# Patient Record
Sex: FEMALE | Race: WHITE | NOT HISPANIC OR LATINO | Employment: FULL TIME | URBAN - METROPOLITAN AREA
[De-identification: names, ages, dates, MRNs, and addresses within clinical notes are randomized per-mention and may not be internally consistent; named-entity substitution may affect disease eponyms.]

---

## 2017-01-04 ENCOUNTER — OFFICE VISIT (OUTPATIENT)
Dept: URGENT CARE | Facility: PHYSICIAN GROUP | Age: 57
End: 2017-01-04
Payer: COMMERCIAL

## 2017-01-04 VITALS
SYSTOLIC BLOOD PRESSURE: 122 MMHG | TEMPERATURE: 99.1 F | HEART RATE: 77 BPM | WEIGHT: 142 LBS | RESPIRATION RATE: 14 BRPM | HEIGHT: 67 IN | OXYGEN SATURATION: 99 % | BODY MASS INDEX: 22.29 KG/M2 | DIASTOLIC BLOOD PRESSURE: 78 MMHG

## 2017-01-04 DIAGNOSIS — J01.90 ACUTE RHINOSINUSITIS: ICD-10-CM

## 2017-01-04 PROCEDURE — 99214 OFFICE O/P EST MOD 30 MIN: CPT | Performed by: PHYSICIAN ASSISTANT

## 2017-01-04 RX ORDER — AMOXICILLIN AND CLAVULANATE POTASSIUM 875; 125 MG/1; MG/1
1 TABLET, FILM COATED ORAL 2 TIMES DAILY
Qty: 20 TAB | Refills: 0 | Status: SHIPPED | OUTPATIENT
Start: 2017-01-04 | End: 2017-03-29

## 2017-01-04 RX ORDER — FLUTICASONE PROPIONATE 50 MCG
1 SPRAY, SUSPENSION (ML) NASAL 2 TIMES DAILY
Qty: 16 G | Refills: 0 | Status: SHIPPED | OUTPATIENT
Start: 2017-01-04 | End: 2018-06-22

## 2017-01-04 NOTE — Clinical Note
January 4, 2017         Patient: Mei Sullivan   YOB: 1960   Date of Visit: 1/4/2017           To Whom it May Concern:    Mei Sullivan was seen in my clinic on 1/4/2017. She is to be off work today and tomorrow to recover.      If you have any questions or concerns, please don't hesitate to call.        Sincerely,           Leonor Potts PA-C  Electronically Signed

## 2017-01-04 NOTE — PROGRESS NOTES
Chief Complaint   Patient presents with   • Cough     W/ congestion and sinus pain       HISTORY OF PRESENT ILLNESS: Patient is a 56 y.o. female who presents today for 8 or so days of sinus congestion and pressure.  She has had sore throat on and off.   Fevers tactile on and off. Feels worse at night.   She is only really having a cough since last night.   She feels overall she is worsening.   Former smoker, no hx of COPD.  She has pressure diffusely however is seemingly worsening on the right cheek.  Taking NyQuil and DayQuil with mild relief.      Patient Active Problem List    Diagnosis Date Noted   • Dyspnea    • Palpitations    • Cox's esophagus 06/22/2015   • Hepatitis B antibody positive 03/12/2015   • Lesion of plantar nerve 10/18/2013   • Bulimia 01/12/2012   • Insomnia 03/11/2011   • CATARACT 01/25/2010   • Osteopenia 01/05/2010   • Constipation 01/05/2010   • Menopause 01/05/2010   • Osteoarthritis 01/05/2010       Allergies:Fosamax    Current Outpatient Prescriptions Ordered in [facility]   Medication Sig Dispense Refill   • zolpidem (AMBIEN) 5 MG Tab TAKE ONE-HALF TABLET BY MOUTH EVERY OTHER NIGHT 30 Tab 2   • naproxen (NAPROSYN) 500 MG Tab Take 1 Tab by mouth 2 times a day, with meals. 60 Tab 3   • cyclobenzaprine (FLEXERIL) 5 MG tablet Take 1-2 Tabs by mouth at bedtime as needed. 20 Tab 0   • albuterol (VENTOLIN OR PROVENTIL) 108 (90 BASE) MCG/ACT AERS inhalation aerosol Inhale 1-2 Puffs by mouth every 6 hours as needed for Shortness of Breath. 8.5 g 0   • Spacer/Aero Chamber Mouthpiece MISC 1 Applicator by Does not apply route every 6 hours as needed (use with inhaler). 1 Each 0   • meloxicam (MOBIC) 15 MG tablet TAKE ONE TABLET BY MOUTH EVERY DAY AS NEEDED FOR ARTHRITIS 90 Tab 2   • Protein POWD Take 2 Tbsp by mouth every 48 hours. Patient uses Isopure Powder in milk with banana and this helps hot flashes      • Calcium Carbonate-Vit D-Min (CALTRATE 600+D PLUS) 600-400 MG-UNIT TABS Take 2 Tabs by  "mouth every day.       No current [facility]-ordered facility-administered medications on file.       Past Medical History   Diagnosis Date   • Arthritis      Osteo; both wrists   • Dyspnea    • Palpitations        Social History   Substance Use Topics   • Smoking status: Former Smoker     Types: Cigarettes     Quit date: 10/13/2008   • Smokeless tobacco: Never Used   • Alcohol Use: No       Family Status   Relation Status Death Age   • Mother Alive    • Father Alive    • Sister Alive      Family History   Problem Relation Age of Onset   • Hypertension Father        ROS:  Review of Systems   Constitutional: SEE HPI  HENT: SEE HPI  Eyes: Negative for blurred vision.   Respiratory: SEE HPI    Cardiovascular: Negative for chest pain, palpitations, orthopnea and leg swelling.   Gastrointestinal: Negative for heartburn, nausea, vomiting and abdominal pain.   All other systems reviewed and are negative.     Exam:  Blood pressure 122/78, pulse 77, temperature 37.3 °C (99.1 °F), resp. rate 14, height 1.702 m (5' 7\"), weight 64.411 kg (142 lb), last menstrual period 08/01/2006, SpO2 99 %.  General:  Well nourished, well developed female in NAD  Eyes: PERRLA, EOM within normal limits, no conjunctival injection, no scleral icterus, visual fields and acuity grossly intact.  Ears: Normal shape and symmetry, no tenderness, no discharge. External canals are without any significant edema or erythema. Tympanic membranes are without any inflammation, no effusion. Gross auditory acuity is intact  Nose: Symmetrical, right maxillary sinus TTP, clear rhinorrhea.   Mouth: reasonable hygiene, no erythema exudates or tonsillar enlargement.  Neck: no masses, range of motion within normal limits, no tracheal deviation. No lymphadenopathy  Pulmonary: Normal respiratory effort, no wheezes, crackles, or rhonchi.  Cardiovascular: regular rate and rhythm without murmurs, rubs, or gallops.  Skin: No visible rashes or lesion. Warm, pink, dry. "   Extremities: no clubbing, cyanosis, or edema.  Neuro: A&O x 3. Speech normal/clear.  Normal gait.         Assessment/Plan:  1. Acute rhinosinusitis  amoxicillin-clavulanate (AUGMENTIN) 875-125 MG Tab    fluticasone (FLONASE) 50 MCG/ACT nasal spray       -fluids, rest emphasized.  Flonase/Allegra or similar for post nasal drainage trial.   -humidifier/steam inhalations.    -discussed trial of targeted sinus care. With contingent antibiotic prescription given to patient to fill upon meeting criteria of guidelines discussed.       Supportive care, differential diagnoses, and indications for immediate follow-up discussed with patient.   Pathogenesis of diagnosis discussed including typical length and natural progression.   Instructed to return to clinic or nearest emergency department for any change in condition, further concerns, or worsening of symptoms.  Patient states understanding of the plan of care and discharge instructions.  Instructed to make an appointment, for follow up, with their primary care provider.      Leonor Potts PA-C

## 2017-03-29 ENCOUNTER — OFFICE VISIT (OUTPATIENT)
Dept: MEDICAL GROUP | Facility: PHYSICIAN GROUP | Age: 57
End: 2017-03-29
Payer: COMMERCIAL

## 2017-03-29 ENCOUNTER — HOSPITAL ENCOUNTER (OUTPATIENT)
Facility: MEDICAL CENTER | Age: 57
End: 2017-03-29
Attending: FAMILY MEDICINE
Payer: COMMERCIAL

## 2017-03-29 VITALS
WEIGHT: 154 LBS | BODY MASS INDEX: 24.17 KG/M2 | TEMPERATURE: 97.6 F | HEIGHT: 67 IN | HEART RATE: 78 BPM | RESPIRATION RATE: 16 BRPM | SYSTOLIC BLOOD PRESSURE: 118 MMHG | DIASTOLIC BLOOD PRESSURE: 60 MMHG

## 2017-03-29 DIAGNOSIS — M85.80 OSTEOPENIA: ICD-10-CM

## 2017-03-29 DIAGNOSIS — K22.719 BARRETT'S ESOPHAGUS WITH DYSPLASIA: ICD-10-CM

## 2017-03-29 DIAGNOSIS — Z98.890 S/P BALLOON DILATATION OF ESOPHAGEAL STRICTURE: ICD-10-CM

## 2017-03-29 DIAGNOSIS — Z00.00 HEALTH CARE MAINTENANCE: Primary | ICD-10-CM

## 2017-03-29 DIAGNOSIS — N95.8 OTHER SPECIFIED MENOPAUSAL AND POSTMENOPAUSAL DISORDER: ICD-10-CM

## 2017-03-29 PROCEDURE — 88175 CYTOPATH C/V AUTO FLUID REDO: CPT

## 2017-03-29 PROCEDURE — 99396 PREV VISIT EST AGE 40-64: CPT | Performed by: FAMILY MEDICINE

## 2017-03-29 RX ORDER — PANTOPRAZOLE SODIUM 40 MG/1
40 TABLET, DELAYED RELEASE ORAL DAILY
Qty: 90 TAB | Refills: 3 | Status: SHIPPED | OUTPATIENT
Start: 2017-03-29 | End: 2018-04-20 | Stop reason: SDUPTHER

## 2017-03-29 ASSESSMENT — PATIENT HEALTH QUESTIONNAIRE - PHQ9: CLINICAL INTERPRETATION OF PHQ2 SCORE: 0

## 2017-03-29 NOTE — PROGRESS NOTES
Patient comes in for annual exam. Her last Pap smear was in 2014. She is doing well. She has no pelvic pain or vaginal discharge or bleeding. She stopped taking pantoprazole for a while. She does have Cox's esophagus and history of esophageal stricture which had very dilated. I told to go back on pantoprazole. She'll be following up with her gastroenterologist soon.  Patient's due for lab work.  Patient's due for bone density.  She's been promoted in her job at [employer]. She is attending college online to get a bachelor's in Healthcare Business.      Review of Systems   Constitutional: Negative.  Negative for fever, chills, weight loss and malaise/fatigue.   HENT: Negative for hearing loss, ear pain, congestion, sore throat, neck pain and tinnitus.    Eyes: Negative for blurred vision, double vision and pain.   Respiratory: Negative for cough, hemoptysis, shortness of breath and wheezing.    Cardiovascular: Negative for chest pain, palpitations, orthopnea, claudication, leg swelling and PND.   Gastrointestinal: Negative for heartburn, nausea, vomiting, abdominal pain, diarrhea, constipation, blood in stool and melena.   Genitourinary: Negative for incontinence, dysuria, urgency, frequency and hematuria. Female-- Negative for pelvic pain, vaginal discharge, or abnormal bleeding. No breast lumps, or masses, nipple bleeding or discharge.   Musculoskeletal: Negative for myalgias, back pain and joint pain.   Skin: Negative for rash and itching. No lesions that will not heal.  Neurological: Negative for dizziness, tingling, tremors, focal weakness, seizures, loss of consciousness and headaches.   Endo/Heme/Allergies: Negative for environmental allergies and polydipsia.  Does not bruise/bleed easily.   Psychiatric/Behavioral: Negative for depression, memory loss and substance abuse.  The patient is not nervous/anxious and does not have insomnia.  All others negative.      I reviewed the following    Past Medical History    Diagnosis Date   • Arthritis      Osteo; both wrists   • Dyspnea    • Palpitations         Past Surgical History   Procedure Laterality Date   • Tonsillectomy and adenoidectomy  1972   • Laminotomy  1985     coccyxectomy   • Neuroma mortons excision  1990     Left foot   • Neuroma excision  10/18/2013     Performed by FELIPE DoanPSIMIN at [facility]       Allergies   Allergen Reactions   • Fosamax      Esophageal Pain       Current Outpatient Prescriptions   Medication Sig Dispense Refill   • pantoprazole (PROTONIX) 40 MG Tablet Delayed Response Take 1 Tab by mouth every day. 90 Tab 3   • Protein POWD Take 2 Tbsp by mouth every 48 hours. Patient uses Isopure Powder in milk with banana and this helps hot flashes      • Calcium Carbonate-Vit D-Min (CALTRATE 600+D PLUS) 600-400 MG-UNIT TABS Take 2 Tabs by mouth every day.     • fluticasone (FLONASE) 50 MCG/ACT nasal spray Spray 1 Spray in nose 2 times a day. 16 g 0   • zolpidem (AMBIEN) 5 MG Tab TAKE ONE-HALF TABLET BY MOUTH EVERY OTHER NIGHT 30 Tab 2   • naproxen (NAPROSYN) 500 MG Tab Take 1 Tab by mouth 2 times a day, with meals. 60 Tab 3   • cyclobenzaprine (FLEXERIL) 5 MG tablet Take 1-2 Tabs by mouth at bedtime as needed. 20 Tab 0     No current facility-administered medications for this visit.        Family History   Problem Relation Age of Onset   • Hypertension Father        Social History     Social History   • Marital Status:      Spouse Name: N/A   • Number of Children: N/A   • Years of Education: N/A     Occupational History   • Not on file.     Social History Main Topics   • Smoking status: Former Smoker     Types: Cigarettes     Quit date: 10/13/2008   • Smokeless tobacco: Never Used   • Alcohol Use: No   • Drug Use: No   • Sexual Activity:     Partners: Male     Birth Control/ Protection: Post-Menopausal     Other Topics Concern   • Not on file     Social History Narrative          Physical Exam   Constitutional: She  is oriented. She appears well-developed and well-nourished. She appears not diaphoretic. No distress.   HENT:   Head: Normocephalic and atraumatic.   Right Ear: External ear normal. Ear canal and TM normal  Left Ear: External ear normal. Ear canal and TM normal  Nose: Nose normal.   Mouth/Throat: Oropharynx is clear and moist. No oropharyngeal exudate.   Eyes: Conjunctivae and extraocular motions are normal. Pupils are equal, round, and reactive to light. Right eye exhibits no discharge. Left eye exhibits no discharge. No scleral icterus. Fundi benign bilaterally.   Neck: Normal range of motion. Neck supple. No tracheal deviation present. No thyromegaly present.   Cardiovascular: Normal rate, regular rhythm and normal heart sounds.  Exam reveals no gallop.    No murmur heard.  Pulmonary/Chest: Effort normal and breath sounds normal. No stridor. No respiratory distress. She has no wheezes. She has no rales. Right breast exhibits no inverted nipple, no mass, no nipple discharge, no skin change and no tenderness. Left breast exhibits no inverted nipple, no mass, no nipple discharge, no skin change and no tenderness. Breasts are symmetrical.        No axillary adenopathy   Abdominal: Soft. Bowel sounds are normal. She exhibits no distension and no mass. No tenderness. No hepatosplenomegaly. She has no rebound and no guarding. Hernia confirmed negative in the right inguinal area and confirmed negative in the left inguinal area.   Genitourinary: BUS within normal limits and introitus appears normal. Vagina normal and uterus normal.   Uterus is not deviated, not enlarged, not fixed and not tender. Cervix exhibits no motion tenderness, no discharge and no friability. Right adnexum displays no mass, no tenderness and no fullness. Left adnexum displays no mass, no tenderness and no fullness. No discharge found.  Rectal exam is without mass or pain. There is good sphincter tone. Stool is light brown and occult blood negative.    Musculoskeletal: Normal range of motion. She exhibits no edema and no tenderness.   Lymphadenopathy:   She has no cervical adenopathy.   She has no supraclavicular adenopathy       Right: No inguinal adenopathy present.        Left: No inguinal adenopathy present.   Neurological: She is alert and oriented. She displays normal reflexes. No cranial nerve deficit. She exhibits normal muscle tone. Coordination normal.   Skin: Skin is warm. No rash noted. She is not diaphoretic. No erythema. No pallor.   Psychiatric: She has a normal mood and appropriate affect. Her behavior is normal. Thought content normal.     1. Health care maintenance --doing well  THINPREP PAP,REFLEX HPV ON ASC-US ONLY    COMP METABOLIC PANEL    LIPID PROFILE    CBC WITH DIFFERENTIAL    URINALYSIS,CULTURE IF INDICATED    VITAMIN D 25-HYDROXY   2. Cox's esophagus with dysplasia  pantoprazole (PROTONIX) 40 MG Tablet Delayed Response--I told the patient to stay on this until she was advised otherwise by her gastroenterologist    3. S/P balloon dilatation of esophageal stricture  pantoprazole (PROTONIX) 40 MG Tablet Delayed Response   4. Other specified menopausal and postmenopausal disorder  DS-BONE DENSITY STUDY (DEXA)--- if this shows a worsening of her bone density, the patient is willing to try PROLIA. She does have significant esophageal issues so I am not going to use Fosamax.    5. Osteopenia  DS-BONE DENSITY STUDY (DEXA)       Recheck one year or when necessary    Please note that this dictation was created using voice recognition software. I have worked with consultants from the vendor as well as technical experts from [facility] [employer] to optimize the interface. I have made every reasonable attempt to correct obvious errors, but I expect that there are errors of grammar and possibly content that I did not discover before finalizing the note.

## 2017-03-29 NOTE — PATIENT INSTRUCTIONS
Patient given written instructions regarding labs, imaging, medications, referrals, dietary and lifestyle management, and return visit.    Ji Costa MD

## 2017-03-29 NOTE — MR AVS SNAPSHOT
"Mei Sullivan   3/29/2017 9:00 AM   Office Visit   MRN: [id number]    Department:  [facility]   Dept Phone:  [phone]    Description:  Female : 1960   Provider:  Ji Costa M.D.           Reason for Visit     Annual Exam     Gynecologic Exam           Allergies as of 3/29/2017     Allergen Noted Reactions    Fosamax 10/30/2014       Esophageal Pain      You were diagnosed with     Health care maintenance   [883636]       Cox's esophagus with dysplasia   [913462]       S/P balloon dilatation of esophageal stricture   [763672]       Other specified menopausal and postmenopausal disorder   [627.8.ICD-9-CM]       Osteopenia   [784015]         Vital Signs     Blood Pressure Pulse Temperature Respirations Height Weight    118/60 mmHg 78 36.4 °C (97.6 °F) 16 1.702 m (5' 7\") 69.854 kg (154 lb)    Body Mass Index Last Menstrual Period Smoking Status             24.11 kg/m2 2006 Former Smoker         Basic Information     Date Of Birth Sex Race Ethnicity Preferred Language    1960 Female White Non- English      Problem List              ICD-10-CM Priority Class Noted - Resolved    Osteopenia M85.80   2010 - Present    Constipation    2010 - Present    Osteoarthritis M19.90   2010 - Present    CATARACT    2010 - Present    Insomnia G47.00   3/11/2011 - Present    Bulimia F50.2   2012 - Present    Lesion of plantar nerve G57.60   10/18/2013 - Present    Hepatitis B antibody positive R76.8   3/12/2015 - Present    Cox's esophagus K22.70   2015 - Present    Dyspnea (Chronic) R06.00   Unknown - Present    Palpitations (Chronic) R00.2   Unknown - Present    S/P balloon dilatation of esophageal stricture Z98.890   3/29/2017 - Present    Other specified menopausal and postmenopausal disorder N95.8   3/29/2017 - Present      Health Maintenance        Date Due Completion Dates    PAP SMEAR 2017, 3/11/2011, 2010   " MAMMOGRAM 9/6/2017 9/6/2016, 6/17/2014, 4/12/2011, 1/15/2010, 1/15/2010, 1/2/2009, 1/2/2009    COLONOSCOPY 5/28/2020 5/28/2010 (Done)    Override on 5/28/2010: Done    IMM DTaP/Tdap/Td Vaccine (2 - Td) 7/19/2020 7/19/2010            Current Immunizations     Influenza TIV (IM) 10/10/2013, 10/23/2012, 12/18/2011, 12/10/2010    Influenza Vaccine Quad Inj (Pf) 11/9/2016 11:18 AM, 10/9/2014    Influenza Vaccine Quad Inj (Preserved) 10/21/2015    Tdap Vaccine 7/19/2010      Below and/or attached are the medications your provider expects you to take. Review all of your home medications and newly ordered medications with your provider and/or pharmacist. Follow medication instructions as directed by your provider and/or pharmacist. Please keep your medication list with you and share with your provider. Update the information when medications are discontinued, doses are changed, or new medications (including over-the-counter products) are added; and carry medication information at all times in the event of emergency situations     Allergies:  FOSAMAX - (reactions not documented)               Medications  Valid as of: March 29, 2017 - 10:01 AM    Generic Name Brand Name Tablet Size Instructions for use    Calcium Carbonate-Vit D-Min (Tab) Caltrate 600+D Plus 600-400 MG-UNIT Take 2 Tabs by mouth every day.        Cyclobenzaprine HCl (Tab) FLEXERIL 5 MG Take 1-2 Tabs by mouth at bedtime as needed.        Fluticasone Propionate (Suspension) FLONASE 50 MCG/ACT Spray 1 Spray in nose 2 times a day.        Naproxen (Tab) NAPROSYN 500 MG Take 1 Tab by mouth 2 times a day, with meals.        Pantoprazole Sodium (Tablet Delayed Response) PROTONIX 40 MG Take 1 Tab by mouth every day.        Protein (Powder) Protein  Take 2 Tbsp by mouth every 48 hours. Patient uses Isopure Powder in milk with banana and this helps hot flashes         Zolpidem Tartrate (Tab) AMBIEN 5 MG TAKE ONE-HALF TABLET BY MOUTH EVERY OTHER NIGHT        .                  Medicines prescribed today were sent to:     [id number] - [place], [place] - [address]    [address] [place] [address]    Phone: [phone] Fax: [phone]    Open 24 Hours?: No      Medication refill instructions:       If your prescription bottle indicates you have medication refills left, it is not necessary to call your provider’s office. Please contact your pharmacy and they will refill your medication.    If your prescription bottle indicates you do not have any refills left, you may request refills at any time through one of the following ways: The online [facility] system (except Urgent Care), by calling your provider’s office, or by asking your pharmacy to contact your provider’s office with a refill request. Medication refills are processed only during regular business hours and may not be available until the next business day. Your provider may request additional information or to have a follow-up visit with you prior to refilling your medication.   *Please Note: Medication refills are assigned a new Rx number when refilled electronically. Your pharmacy may indicate that no refills were authorized even though a new prescription for the same medication is available at the pharmacy. Please request the medicine by name with the pharmacy before contacting your provider for a refill.        Your To Do List     Future Labs/Procedures Complete By Expires    CBC WITH DIFFERENTIAL  As directed 3/30/2018    COMP METABOLIC PANEL  As directed 3/30/2018    DS-BONE DENSITY STUDY (DEXA)  As directed 9/29/2017    LIPID PROFILE  As directed 3/30/2018    THINPREP PAP,REFLEX HPV ON ASC-US ONLY  As directed 3/30/2018    URINALYSIS,CULTURE IF INDICATED  As directed 3/30/2018         [facility] Access Code: Activation code not generated  Current [facility] Status: Active

## 2017-03-30 DIAGNOSIS — Z00.00 HEALTH CARE MAINTENANCE: ICD-10-CM

## 2017-03-30 LAB — CYTOLOGY REG CYTOL: NORMAL

## 2017-04-12 ENCOUNTER — HOSPITAL ENCOUNTER (OUTPATIENT)
Dept: LAB | Facility: MEDICAL CENTER | Age: 57
End: 2017-04-12
Attending: FAMILY MEDICINE
Payer: COMMERCIAL

## 2017-04-12 ENCOUNTER — HOSPITAL ENCOUNTER (OUTPATIENT)
Dept: LAB | Facility: MEDICAL CENTER | Age: 57
End: 2017-04-12
Payer: COMMERCIAL

## 2017-04-12 LAB
25(OH)D3 SERPL-MCNC: 43 NG/ML (ref 30–100)
ALBUMIN SERPL BCP-MCNC: 4 G/DL (ref 3.2–4.9)
ALBUMIN/GLOB SERPL: 1.5 G/DL
ALP SERPL-CCNC: 104 U/L (ref 30–99)
ALT SERPL-CCNC: 19 U/L (ref 2–50)
ANION GAP SERPL CALC-SCNC: 5 MMOL/L (ref 0–11.9)
AST SERPL-CCNC: 24 U/L (ref 12–45)
BASOPHILS # BLD AUTO: 1.5 % (ref 0–1.8)
BASOPHILS # BLD: 0.07 K/UL (ref 0–0.12)
BDY FAT % MEASURED: 23.5 %
BILIRUB SERPL-MCNC: 0.5 MG/DL (ref 0.1–1.5)
BP DIAS: 72 MMHG
BP SYS: 117 MMHG
BUN SERPL-MCNC: 16 MG/DL (ref 8–22)
CALCIUM SERPL-MCNC: 9.4 MG/DL (ref 8.5–10.5)
CHLORIDE SERPL-SCNC: 107 MMOL/L (ref 96–112)
CHOLEST SERPL-MCNC: 220 MG/DL (ref 100–199)
CHOLEST SERPL-MCNC: 221 MG/DL (ref 100–199)
CO2 SERPL-SCNC: 26 MMOL/L (ref 20–33)
CREAT SERPL-MCNC: 0.82 MG/DL (ref 0.5–1.4)
DIABETES HTDIA: NO
EOSINOPHIL # BLD AUTO: 0.42 K/UL (ref 0–0.51)
EOSINOPHIL NFR BLD: 9 % (ref 0–6.9)
ERYTHROCYTE [DISTWIDTH] IN BLOOD BY AUTOMATED COUNT: 38.9 FL (ref 35.9–50)
EVENT NAME HTEVT: NORMAL
FASTING HTFAS: YES
GFR SERPL CREATININE-BSD FRML MDRD: >60 ML/MIN/1.73 M 2
GLOBULIN SER CALC-MCNC: 2.7 G/DL (ref 1.9–3.5)
GLUCOSE SERPL-MCNC: 76 MG/DL (ref 65–99)
GLUCOSE SERPL-MCNC: 76 MG/DL (ref 65–99)
HCT VFR BLD AUTO: 40.3 % (ref 37–47)
HDLC SERPL-MCNC: 75 MG/DL
HDLC SERPL-MCNC: 76 MG/DL
HGB BLD-MCNC: 13.6 G/DL (ref 12–16)
HYPERTENSION HTHYP: NO
IMM GRANULOCYTES # BLD AUTO: 0 K/UL (ref 0–0.11)
IMM GRANULOCYTES NFR BLD AUTO: 0 % (ref 0–0.9)
LDLC SERPL CALC-MCNC: 123 MG/DL
LDLC SERPL CALC-MCNC: 125 MG/DL
LYMPHOCYTES # BLD AUTO: 1.77 K/UL (ref 1–4.8)
LYMPHOCYTES NFR BLD: 38.1 % (ref 22–41)
MCH RBC QN AUTO: 29.8 PG (ref 27–33)
MCHC RBC AUTO-ENTMCNC: 33.7 G/DL (ref 33.6–35)
MCV RBC AUTO: 88.2 FL (ref 81.4–97.8)
MONOCYTES # BLD AUTO: 0.3 K/UL (ref 0–0.85)
MONOCYTES NFR BLD AUTO: 6.5 % (ref 0–13.4)
NEUTROPHILS # BLD AUTO: 2.09 K/UL (ref 2–7.15)
NEUTROPHILS NFR BLD: 44.9 % (ref 44–72)
NRBC # BLD AUTO: 0 K/UL
NRBC BLD AUTO-RTO: 0 /100 WBC
PLATELET # BLD AUTO: 319 K/UL (ref 164–446)
PMV BLD AUTO: 10.5 FL (ref 9–12.9)
POTASSIUM SERPL-SCNC: 3.9 MMOL/L (ref 3.6–5.5)
PROT SERPL-MCNC: 6.7 G/DL (ref 6–8.2)
RBC # BLD AUTO: 4.57 M/UL (ref 4.2–5.4)
SCREENING LOC CITY HTCIT: NORMAL
SCREENING LOC STATE HTSTA: NORMAL
SCREENING LOCATION HTLOC: NORMAL
SMOKING HTSMO: NO
SODIUM SERPL-SCNC: 138 MMOL/L (ref 135–145)
SUBSCRIBER ID HTSID: NORMAL
TRIGL SERPL-MCNC: 103 MG/DL (ref 0–149)
TRIGL SERPL-MCNC: 104 MG/DL (ref 0–149)
WBC # BLD AUTO: 4.7 K/UL (ref 4.8–10.8)

## 2017-04-12 PROCEDURE — 36415 COLL VENOUS BLD VENIPUNCTURE: CPT

## 2017-04-12 PROCEDURE — 80061 LIPID PANEL: CPT | Mod: 91

## 2017-04-12 PROCEDURE — S5190 WELLNESS ASSESSMENT BY NONPH: HCPCS

## 2017-04-12 PROCEDURE — 82947 ASSAY GLUCOSE BLOOD QUANT: CPT

## 2017-04-12 PROCEDURE — 80053 COMPREHEN METABOLIC PANEL: CPT

## 2017-04-12 PROCEDURE — 85025 COMPLETE CBC W/AUTO DIFF WBC: CPT

## 2017-04-12 PROCEDURE — 82306 VITAMIN D 25 HYDROXY: CPT

## 2017-04-12 PROCEDURE — 80061 LIPID PANEL: CPT

## 2017-04-13 ENCOUNTER — HOSPITAL ENCOUNTER (OUTPATIENT)
Facility: MEDICAL CENTER | Age: 57
End: 2017-04-13
Attending: FAMILY MEDICINE
Payer: COMMERCIAL

## 2017-04-13 LAB
APPEARANCE UR: CLEAR
BILIRUB UR QL STRIP.AUTO: NEGATIVE
COLOR UR: YELLOW
CULTURE IF INDICATED INDCX: NO UA CULTURE
GLUCOSE UR STRIP.AUTO-MCNC: NEGATIVE MG/DL
KETONES UR STRIP.AUTO-MCNC: NEGATIVE MG/DL
LEUKOCYTE ESTERASE UR QL STRIP.AUTO: NEGATIVE
MICRO URNS: NORMAL
NITRITE UR QL STRIP.AUTO: NEGATIVE
PH UR STRIP.AUTO: 6.5 [PH]
PROT UR QL STRIP: NEGATIVE MG/DL
RBC UR QL AUTO: NEGATIVE
SP GR UR STRIP.AUTO: 1.01

## 2017-04-13 PROCEDURE — 81003 URINALYSIS AUTO W/O SCOPE: CPT

## 2017-04-20 ENCOUNTER — TELEPHONE (OUTPATIENT)
Dept: MEDICAL GROUP | Facility: PHYSICIAN GROUP | Age: 57
End: 2017-04-20

## 2017-04-20 DIAGNOSIS — R13.19 INTERMITTENT DYSPHAGIA: ICD-10-CM

## 2017-04-20 NOTE — TELEPHONE ENCOUNTER
1. Caller Name: [facility]                                         Call Back Number: [phone]      Patient approves a detailed voicemail message: N\A    2. SPECIFIC Action To Be Taken: Referral Urgent    3. Diagnosis/Clinical Reason for Request: R13.19    4. Specialty & Provider Name/Lab/Imaging Location: [facility] [place] [address] [phone]    5. Is appointment scheduled for requested order/referral: yes - 4/21/17    Patient informed they will receive a return phone call from the office ONLY if there are any questions before processing their request. Advised to call back if they haven't received a call from the referral department in 5 days.

## 2017-05-01 PROBLEM — A04.8 H. PYLORI INFECTION: Status: ACTIVE | Noted: 2017-05-01

## 2017-05-02 ENCOUNTER — HOSPITAL ENCOUNTER (OUTPATIENT)
Dept: RADIOLOGY | Facility: MEDICAL CENTER | Age: 57
End: 2017-05-02
Attending: FAMILY MEDICINE
Payer: COMMERCIAL

## 2017-05-02 DIAGNOSIS — N95.8 OTHER SPECIFIED MENOPAUSAL AND POSTMENOPAUSAL DISORDER: ICD-10-CM

## 2017-05-02 DIAGNOSIS — M85.80 OSTEOPENIA: ICD-10-CM

## 2017-05-02 PROCEDURE — 77080 DXA BONE DENSITY AXIAL: CPT

## 2017-05-31 DIAGNOSIS — Z87.891 PERSONAL HISTORY OF TOBACCO USE, PRESENTING HAZARDS TO HEALTH: ICD-10-CM

## 2017-06-01 ENCOUNTER — TELEPHONE (OUTPATIENT)
Dept: HEMATOLOGY ONCOLOGY | Facility: MEDICAL CENTER | Age: 57
End: 2017-06-01

## 2017-06-01 NOTE — TELEPHONE ENCOUNTER
Called pt to prescreen for lung cancer screening program. Left message requesting call back to RN at [phone].

## 2017-06-08 ENCOUNTER — TELEPHONE (OUTPATIENT)
Dept: HEMATOLOGY ONCOLOGY | Facility: MEDICAL CENTER | Age: 57
End: 2017-06-08

## 2017-06-08 NOTE — TELEPHONE ENCOUNTER
Received referral to lung cancer screening program.  Chart review to assess for lung cancer screening program eligibility.   1. Age 55-77 yrs of age? Yes 57 y.o.  2. 30 pack year hx of smoking, or greater? Yes 2-3 [id number]= [id number] hx  3. Current smoker or if quit, has pt quit within last 15 yrs?Yes  - quit 2008  4. Any signs or symptoms of lung cancer? None noted  5. Previous history of lung cancer? None noted  6. Chest CT within past 12 mos.? None noted  Patient does meet eligibility criteria. [facility] scheduling notified to schedule the shared decision making visit.

## 2017-06-09 PROBLEM — D22.5 MELANOCYTIC NEVI OF TRUNK: Status: ACTIVE | Noted: 2017-06-09

## 2017-06-14 ENCOUNTER — TELEPHONE (OUTPATIENT)
Dept: HEMATOLOGY ONCOLOGY | Facility: MEDICAL CENTER | Age: 57
End: 2017-06-14

## 2017-06-14 NOTE — TELEPHONE ENCOUNTER
1st attempt to schedule a shared decision making appointment with Leelee Mccartney for the lung cancer screening.  Ref: Radha Dx: Former Smoker

## 2017-06-27 ENCOUNTER — TELEPHONE (OUTPATIENT)
Dept: HEMATOLOGY ONCOLOGY | Facility: MEDICAL CENTER | Age: 57
End: 2017-06-27

## 2017-06-27 NOTE — TELEPHONE ENCOUNTER
Left a message on mobile, requesting that her appointment on 7/5/2017 at 4:30 be R/S to 7/5/2017 at 5pm.  Requested a call back confirming the new apt time.

## 2017-07-05 ENCOUNTER — OFFICE VISIT (OUTPATIENT)
Dept: HEMATOLOGY ONCOLOGY | Facility: MEDICAL CENTER | Age: 57
End: 2017-07-05
Payer: COMMERCIAL

## 2017-07-05 VITALS
WEIGHT: 155.65 LBS | OXYGEN SATURATION: 94 % | HEIGHT: 67 IN | BODY MASS INDEX: 24.43 KG/M2 | DIASTOLIC BLOOD PRESSURE: 62 MMHG | TEMPERATURE: 98.9 F | SYSTOLIC BLOOD PRESSURE: 106 MMHG | HEART RATE: 89 BPM | RESPIRATION RATE: 16 BRPM

## 2017-07-05 DIAGNOSIS — Z87.891 PERSONAL HISTORY OF NICOTINE DEPENDENCE: ICD-10-CM

## 2017-07-05 PROCEDURE — G0296 VISIT TO DETERM LDCT ELIG: HCPCS | Performed by: NURSE PRACTITIONER

## 2017-07-05 ASSESSMENT — ENCOUNTER SYMPTOMS
WEIGHT LOSS: 0
COUGH: 0
SPUTUM PRODUCTION: 0
SHORTNESS OF BREATH: 1
HEMOPTYSIS: 0
WHEEZING: 0

## 2017-07-05 ASSESSMENT — PAIN SCALES - GENERAL: PAINLEVEL: NO PAIN

## 2017-07-05 NOTE — MR AVS SNAPSHOT
"Mei Sullivan   2017 5:00 PM   Office Visit   MRN: [id number]    Department:  [facility]   Dept Phone:  [phone]    Description:  Female : 1960   Provider:  Leelee Mccartney A.PJesusitaNJesusita           Reason for Visit     Lung Cancer Screening Program Prescreen ILCS: former smoker Ref: Dr. Costa      Allergies as of 2017     Allergen Noted Reactions    Fosamax 10/30/2014       Esophageal Pain      You were diagnosed with     Personal history of nicotine dependence   [258351]         Vital Signs     Blood Pressure Pulse Temperature Respirations Height Weight    106/62 mmHg 89 37.2 °C (98.9 °F) 16 1.702 m (5' 7\") 70.6 kg (155 lb 10.3 oz)    Body Mass Index Oxygen Saturation Last Menstrual Period Breastfeeding? Smoking Status       24.37 kg/m2 94% 2006 No Former Smoker       Basic Information     Date Of Birth Sex Race Ethnicity Preferred Language    1960 Female White Non- English      Problem List              ICD-10-CM Priority Class Noted - Resolved    Osteopenia M85.80   2010 - Present    Constipation    2010 - Present    Osteoarthritis M19.90   2010 - Present    CATARACT    2010 - Present    Insomnia G47.00   3/11/2011 - Present    Bulimia F50.2   2012 - Present    Lesion of plantar nerve G57.60   10/18/2013 - Present    Hepatitis B antibody positive R76.8   3/12/2015 - Present    Cox's esophagus K22.70   2015 - Present    Dyspnea (Chronic) R06.00   Unknown - Present    Palpitations (Chronic) R00.2   Unknown - Present    S/P balloon dilatation of esophageal stricture Z98.890   3/29/2017 - Present    Other specified menopausal and postmenopausal disorder N95.8   3/29/2017 - Present    H. pylori infection A04.8   2017 - Present      Health Maintenance        Date Due Completion Dates    IMM INFLUENZA (1) 2017, 10/21/2015, 10/9/2014, 10/10/2013, 10/23/2012, 2011, 12/10/2010    MAMMOGRAM 2017, " 6/17/2014, 4/12/2011, 1/15/2010, 1/15/2010, 1/2/2009, 1/2/2009    PAP SMEAR 3/29/2020 3/29/2017, 5/6/2014, 3/11/2011, 4/8/2010    COLONOSCOPY 5/28/2020 5/28/2010 (Done)    Override on 5/28/2010: Done    IMM DTaP/Tdap/Td Vaccine (2 - Td) 7/19/2020 7/19/2010            Current Immunizations     Influenza TIV (IM) 10/10/2013, 10/23/2012, 12/18/2011, 12/10/2010    Influenza Vaccine Quad Inj (Pf) 11/9/2016 11:18 AM, 10/9/2014    Influenza Vaccine Quad Inj (Preserved) 10/21/2015    Tdap Vaccine 7/19/2010      Below and/or attached are the medications your provider expects you to take. Review all of your home medications and newly ordered medications with your provider and/or pharmacist. Follow medication instructions as directed by your provider and/or pharmacist. Please keep your medication list with you and share with your provider. Update the information when medications are discontinued, doses are changed, or new medications (including over-the-counter products) are added; and carry medication information at all times in the event of emergency situations     Allergies:  FOSAMAX - (reactions not documented)               Medications  Valid as of: July 06, 2017 -  7:59 AM    Generic Name Brand Name Tablet Size Instructions for use    Calcium Carbonate-Vit D-Min (Tab) Caltrate 600+D Plus 600-400 MG-UNIT Take 2 Tabs by mouth every day.        Cyclobenzaprine HCl (Tab) FLEXERIL 5 MG Take 1-2 Tabs by mouth at bedtime as needed.        Fluticasone Propionate (Suspension) FLONASE 50 MCG/ACT Spray 1 Spray in nose 2 times a day.        Naproxen (Tab) NAPROSYN 500 MG Take 1 Tab by mouth 2 times a day, with meals.        Pantoprazole Sodium (Tablet Delayed Response) PROTONIX 40 MG Take 1 Tab by mouth every day.        Protein (Powder) Protein  Take 2 Tbsp by mouth every 48 hours. Patient uses Isopure Powder in milk with banana and this helps hot flashes         Zolpidem Tartrate (Tab) AMBIEN 5 MG TAKE ONE-HALF TABLET BY MOUTH  EVERY OTHER NIGHT        .                 Medicines prescribed today were sent to:     [id number] - [place], [place] - [address]    [address] [place] [address]    Phone: [phone] Fax: [phone]    Open 24 Hours?: No      Medication refill instructions:       If your prescription bottle indicates you have medication refills left, it is not necessary to call your provider’s office. Please contact your pharmacy and they will refill your medication.    If your prescription bottle indicates you do not have any refills left, you may request refills at any time through one of the following ways: The online [facility] system (except Urgent Care), by calling your provider’s office, or by asking your pharmacy to contact your provider’s office with a refill request. Medication refills are processed only during regular business hours and may not be available until the next business day. Your provider may request additional information or to have a follow-up visit with you prior to refilling your medication.   *Please Note: Medication refills are assigned a new Rx number when refilled electronically. Your pharmacy may indicate that no refills were authorized even though a new prescription for the same medication is available at the pharmacy. Please request the medicine by name with the pharmacy before contacting your provider for a refill.        Your To Do List     Future Labs/Procedures Complete By Expires    CT-LUNG CANCER-SCREENING  As directed 7/5/2018         [facility] Access Code: Activation code not generated  Current [facility] Status: Active

## 2017-07-06 NOTE — PROGRESS NOTES
Subjective:      Mei Sullivan is a 57 y.o. female who presents for lung cancer screening for lung cancer screening shared decision making visit.           HPI    Patient seen today for initial lung cancer screening visit. Patient referred by her PCP Dr. Costa.     The patient meets eligibility criteria including age, smoking history (30+ pack years), if former smoker, quit in the last 15 years, and absence of signs or symptoms of lung cancer.    - Age - 57  - Smoking history - Patient has smoked for 27 years at an average of 2 ppd = 54 pack year smoking history.  - Current smoking status - Former smoker. She quit in 2008, 9 years ago.   - No symptoms of lung cancer and no previous history of lung cancer     Allergies   Allergen Reactions   • Fosamax      Esophageal Pain     Current Outpatient Prescriptions on File Prior to Visit   Medication Sig Dispense Refill   • pantoprazole (PROTONIX) 40 MG Tablet Delayed Response Take 1 Tab by mouth every day. 90 Tab 3   • Protein POWD Take 2 Tbsp by mouth every 48 hours. Patient uses Isopure Powder in milk with banana and this helps hot flashes      • Calcium Carbonate-Vit D-Min (CALTRATE 600+D PLUS) 600-400 MG-UNIT TABS Take 2 Tabs by mouth every day.     • fluticasone (FLONASE) 50 MCG/ACT nasal spray Spray 1 Spray in nose 2 times a day. 16 g 0   • zolpidem (AMBIEN) 5 MG Tab TAKE ONE-HALF TABLET BY MOUTH EVERY OTHER NIGHT 30 Tab 2   • naproxen (NAPROSYN) 500 MG Tab Take 1 Tab by mouth 2 times a day, with meals. 60 Tab 3   • cyclobenzaprine (FLEXERIL) 5 MG tablet Take 1-2 Tabs by mouth at bedtime as needed. 20 Tab 0     No current facility-administered medications on file prior to visit.       Review of Systems   Constitutional: Negative for weight loss and malaise/fatigue.   Respiratory: Positive for shortness of breath (on occasion with activity). Negative for cough, hemoptysis, sputum production and wheezing.           Objective:     /62 mmHg  Pulse 89   "Temp(Src) 37.2 °C (98.9 °F)  Resp 16  Ht 1.702 m (5' 7\")  Wt 70.6 kg (155 lb 10.3 oz)  BMI 24.37 kg/m2  SpO2 94%  LMP 08/01/2006  Breastfeeding? No     Physical Exam   Constitutional: She is oriented to person, place, and time. She appears well-developed and well-nourished. No distress.   HENT:   Head: Normocephalic and atraumatic.   Cardiovascular: Normal rate, regular rhythm and normal heart sounds.  Exam reveals no gallop and no friction rub.    No murmur heard.  Pulmonary/Chest: Effort normal and breath sounds normal. No respiratory distress. She has no wheezes.   Musculoskeletal: Normal range of motion.   Neurological: She is alert and oriented to person, place, and time.   Skin: Skin is warm and dry. She is not diaphoretic.   Vitals reviewed.              Assessment/Plan:     1. Personal history of nicotine dependence  CT-LUNG CANCER-SCREENING         We conducted a shared decision-making process using a decision aid. We reviewed benefits and harms of screening, including false positives and potential need for additional diagnostic testing, the possibility of over diagnosis, and total radiation exposure.    We discussed the importance of adhering to annual LDCT screening. We also discussed the impact of comorbities on the patient's the ability or willingness to undergo diagnostic procedure(s) and treatment.    Counseling on the importance of maintaining cigarette smoking abstinence if former smoker; or the importance of smoking cessation if current smoker and, if appropriate, furnishing of information about tobacco cessation interventions.    Based on our discussion, we have decided to begin annual lung cancer screening starting now.      "

## 2017-07-11 ENCOUNTER — HOSPITAL ENCOUNTER (OUTPATIENT)
Dept: RADIOLOGY | Facility: MEDICAL CENTER | Age: 57
End: 2017-07-11
Attending: NURSE PRACTITIONER
Payer: COMMERCIAL

## 2017-07-11 DIAGNOSIS — Z87.891 PERSONAL HISTORY OF NICOTINE DEPENDENCE: ICD-10-CM

## 2017-07-11 PROCEDURE — G0297 LDCT FOR LUNG CA SCREEN: HCPCS

## 2017-07-13 ENCOUNTER — TELEPHONE (OUTPATIENT)
Dept: HEMATOLOGY ONCOLOGY | Facility: MEDICAL CENTER | Age: 57
End: 2017-07-13

## 2017-07-13 NOTE — TELEPHONE ENCOUNTER
Attempted to reach patient with CT results for lung cancer screening. Left message requesting call back to RN at [phone].

## 2017-07-13 NOTE — TELEPHONE ENCOUNTER
Patient called back for low dose CT results.  Following review with Leelee SAL, notified pt that the results showed no lung nodules and no sign of lung cancer.  Recommended to continue annual screening in 12 months.  She verbalized understanding and was pleased to hear the results. Health maintenance updated and pt sent result letter. Dr. Costa updated via [employer].

## 2017-10-06 ENCOUNTER — IMMUNIZATION (OUTPATIENT)
Dept: OCCUPATIONAL MEDICINE | Facility: CLINIC | Age: 57
End: 2017-10-06

## 2017-10-06 DIAGNOSIS — Z23 NEED FOR VACCINATION: ICD-10-CM

## 2017-10-06 PROCEDURE — 90686 IIV4 VACC NO PRSV 0.5 ML IM: CPT | Performed by: PREVENTIVE MEDICINE

## 2018-01-17 DIAGNOSIS — H25.093 AGE-RELATED INCIPIENT CATARACT OF BOTH EYES: ICD-10-CM

## 2018-01-17 DIAGNOSIS — N95.8 OTHER SPECIFIED MENOPAUSAL AND PERIMENOPAUSAL DISORDERS (CODE): ICD-10-CM

## 2018-01-22 DIAGNOSIS — M25.559 ARTHRALGIA OF HIP, UNSPECIFIED LATERALITY: ICD-10-CM

## 2018-01-22 RX ORDER — NAPROXEN 500 MG/1
500 TABLET ORAL 2 TIMES DAILY WITH MEALS
Qty: 60 TAB | Refills: 3 | Status: SHIPPED | OUTPATIENT
Start: 2018-01-22 | End: 2018-06-22

## 2018-01-25 ENCOUNTER — HOSPITAL ENCOUNTER (OUTPATIENT)
Dept: RADIOLOGY | Facility: MEDICAL CENTER | Age: 58
End: 2018-01-25
Attending: FAMILY MEDICINE
Payer: COMMERCIAL

## 2018-01-25 DIAGNOSIS — N95.8 OTHER SPECIFIED MENOPAUSAL AND PERIMENOPAUSAL DISORDERS (CODE): ICD-10-CM

## 2018-01-25 PROCEDURE — 77067 SCR MAMMO BI INCL CAD: CPT

## 2018-04-15 ENCOUNTER — OFFICE VISIT (OUTPATIENT)
Dept: URGENT CARE | Facility: PHYSICIAN GROUP | Age: 58
End: 2018-04-15
Payer: COMMERCIAL

## 2018-04-15 VITALS
WEIGHT: 158 LBS | DIASTOLIC BLOOD PRESSURE: 78 MMHG | HEART RATE: 102 BPM | HEIGHT: 67 IN | SYSTOLIC BLOOD PRESSURE: 112 MMHG | OXYGEN SATURATION: 96 % | TEMPERATURE: 97.9 F | BODY MASS INDEX: 24.8 KG/M2

## 2018-04-15 DIAGNOSIS — J01.40 ACUTE PANSINUSITIS, RECURRENCE NOT SPECIFIED: ICD-10-CM

## 2018-04-15 DIAGNOSIS — R06.2 WHEEZING: ICD-10-CM

## 2018-04-15 DIAGNOSIS — H10.33 ACUTE CONJUNCTIVITIS OF BOTH EYES, UNSPECIFIED ACUTE CONJUNCTIVITIS TYPE: ICD-10-CM

## 2018-04-15 PROCEDURE — 99214 OFFICE O/P EST MOD 30 MIN: CPT | Performed by: PHYSICIAN ASSISTANT

## 2018-04-15 RX ORDER — AMOXICILLIN AND CLAVULANATE POTASSIUM 875; 125 MG/1; MG/1
1 TABLET, FILM COATED ORAL 2 TIMES DAILY
Qty: 14 TAB | Refills: 0 | Status: SHIPPED | OUTPATIENT
Start: 2018-04-15 | End: 2018-04-22

## 2018-04-15 RX ORDER — POLYMYXIN B SULFATE AND TRIMETHOPRIM 1; 10000 MG/ML; [USP'U]/ML
SOLUTION OPHTHALMIC
Qty: 5 ML | Refills: 0 | Status: SHIPPED | OUTPATIENT
Start: 2018-04-15 | End: 2018-04-15 | Stop reason: SDUPTHER

## 2018-04-15 RX ORDER — ALBUTEROL SULFATE 90 UG/1
2 AEROSOL, METERED RESPIRATORY (INHALATION) EVERY 6 HOURS PRN
Qty: 8.5 G | Refills: 0 | Status: SHIPPED | OUTPATIENT
Start: 2018-04-15 | End: 2018-06-22

## 2018-04-15 RX ORDER — POLYMYXIN B SULFATE AND TRIMETHOPRIM 1; 10000 MG/ML; [USP'U]/ML
SOLUTION OPHTHALMIC
Qty: 5 ML | Refills: 0 | Status: SHIPPED | OUTPATIENT
Start: 2018-04-15 | End: 2018-06-22

## 2018-04-15 ASSESSMENT — ENCOUNTER SYMPTOMS
PHOTOPHOBIA: 0
WHEEZING: 1
HOARSE VOICE: 1
FEVER: 0
DIZZINESS: 0
EYE PAIN: 0
SORE THROAT: 1
BLURRED VISION: 0
DOUBLE VISION: 0
VOMITING: 0
ABDOMINAL PAIN: 0
HEADACHES: 1
EYE REDNESS: 1
NECK PAIN: 0
MYALGIAS: 0
SINUS PRESSURE: 1
EYE DISCHARGE: 1
CHILLS: 0
SINUS PAIN: 1
COUGH: 1
DIARRHEA: 0
TINGLING: 0

## 2018-04-15 NOTE — PROGRESS NOTES
"Subjective:      Mei Sullivan is a 58 y.o. female who presents with Sinus Problem (x5days cough congestion pressure, bilat eye goopy)            Sinusitis   This is a new problem. The current episode started in the past 7 days. The problem has been gradually worsening since onset. There has been no fever. Her pain is at a severity of 5/10. The pain is moderate. Associated symptoms include congestion, coughing, headaches, a hoarse voice, sinus pressure and a sore throat. Pertinent negatives include no chills, ear pain or neck pain. Treatments tried: OTC cough meds.  The treatment provided mild relief.       Review of Systems   Constitutional: Positive for malaise/fatigue. Negative for chills and fever.   HENT: Positive for congestion, hoarse voice, sinus pain, sinus pressure and sore throat. Negative for ear discharge and ear pain.         Pos. For ear pressure     Eyes: Positive for discharge and redness. Negative for blurred vision, double vision, photophobia and pain.   Respiratory: Positive for cough and wheezing.    Cardiovascular: Negative for chest pain and leg swelling.   Gastrointestinal: Negative for abdominal pain, diarrhea and vomiting.   Genitourinary: Negative for dysuria and urgency.   Musculoskeletal: Negative for myalgias and neck pain.   Skin: Negative for itching and rash.   Neurological: Positive for headaches. Negative for dizziness and tingling.   Psychiatric/Behavioral: Depression: .pf.          Objective:     /78   Pulse (!) 102   Temp 36.6 °C (97.9 °F)   Ht 1.702 m (5' 7\")   Wt 71.7 kg (158 lb)   LMP 08/01/2006   SpO2 96%   BMI 24.75 kg/m²    PMH:  has a past medical history of Arthritis; Dyspnea; and Palpitations.  MEDS:   Current Outpatient Prescriptions:   •  amoxicillin-clavulanate (AUGMENTIN) 875-125 MG Tab, Take 1 Tab by mouth 2 times a day for 7 days., Disp: 14 Tab, Rfl: 0  •  albuterol 108 (90 Base) MCG/ACT Aero Soln inhalation aerosol, Inhale 2 Puffs by mouth " every 6 hours as needed for Shortness of Breath., Disp: 8.5 g, Rfl: 0  •  polymixin-trimethoprim (POLYTRIM) 83955-8.1 UNIT/ML-% Solution, Use 1 gtt to both eyes q4 hours x 7 days., Disp: 5 mL, Rfl: 0  •  pantoprazole (PROTONIX) 40 MG Tablet Delayed Response, Take 1 Tab by mouth every day., Disp: 90 Tab, Rfl: 3  •  naproxen (NAPROSYN) 500 MG Tab, Take 1 Tab by mouth 2 times a day, with meals., Disp: 60 Tab, Rfl: 3  •  fluticasone (FLONASE) 50 MCG/ACT nasal spray, Spray 1 Spray in nose 2 times a day., Disp: 16 g, Rfl: 0  •  zolpidem (AMBIEN) 5 MG Tab, TAKE ONE-HALF TABLET BY MOUTH EVERY OTHER NIGHT, Disp: 30 Tab, Rfl: 2  •  cyclobenzaprine (FLEXERIL) 5 MG tablet, Take 1-2 Tabs by mouth at bedtime as needed., Disp: 20 Tab, Rfl: 0  •  Protein POWD, Take 2 Tbsp by mouth every 48 hours. Patient uses Isopure Powder in milk with banana and this helps hot flashes , Disp: , Rfl:   •  Calcium Carbonate-Vit D-Min (CALTRATE 600+D PLUS) 600-400 MG-UNIT TABS, Take 2 Tabs by mouth every day., Disp: , Rfl:   ALLERGIES:   Allergies   Allergen Reactions   • Fosamax      Esophageal Pain     SURGHX:   Past Surgical History:   Procedure Laterality Date   • NEUROMA EXCISION  10/18/2013    Performed by RUEL Doan.P.MJesusita at SURGERY SAME DAY [facility] ORS   • NEUROMA MORTONS EXCISION  1990    Left foot   • LAMINOTOMY  1985    coccyxectomy   • TONSILLECTOMY AND ADENOIDECTOMY  1972     SOCHX:  reports that she quit smoking about 9 years ago. Her smoking use included Cigarettes. She has a 54.00 pack-year smoking history. She has never used smokeless tobacco. She reports that she does not drink alcohol or use drugs.  FH: Family history was reviewed, no pertinent findings to report    Physical Exam   Constitutional: She is oriented to person, place, and time. She appears well-developed and well-nourished.   HENT:   Head: Normocephalic and atraumatic.   Mouth/Throat: No oropharyngeal exudate.   Bilateral clear effusions- without bulge or  erythema to the TM.   Posterior oropharynx with pos. PND without tonsillar edema or erythema.   Nose- boggy turbinates with mild-moderate amount of nasal discharge. Bilateral maxillary sinus tenderness with percussion.    Eyes: EOM are normal. Pupils are equal, round, and reactive to light. Right eye exhibits discharge. Left eye exhibits discharge. Right conjunctiva is injected. Left conjunctiva is injected.   Neck: Normal range of motion. Neck supple.   Cardiovascular: Normal rate and regular rhythm.    Pulmonary/Chest: Effort normal and breath sounds normal. No respiratory distress. She has no wheezes.   Minimal exp. Wheezing lower lung fields.   Without egophony.    Musculoskeletal: Normal range of motion. She exhibits no edema.   Lymphadenopathy:     She has no cervical adenopathy.   Neurological: She is alert and oriented to person, place, and time.   Skin: Skin is warm. No rash noted.   Psychiatric: She has a normal mood and affect. Her behavior is normal.   Vitals reviewed.              Assessment/Plan:     1. Acute pansinusitis, recurrence not specified  - amoxicillin-clavulanate (AUGMENTIN) 875-125 MG Tab; Take 1 Tab by mouth 2 times a day for 7 days.  Dispense: 14 Tab; Refill: 0    2. Acute conjunctivitis of both eyes, unspecified acute conjunctivitis type  - polymixin-trimethoprim (POLYTRIM) 03994-4.1 UNIT/ML-% Solution; Use 1 gtt to both eyes q4 hours x 7 days.  Dispense: 5 mL; Refill: 0    3. Wheezing  - albuterol 108 (90 Base) MCG/ACT Aero Soln inhalation aerosol; Inhale 2 Puffs by mouth every 6 hours as needed for Shortness of Breath.  Dispense: 8.5 g; Refill: 0    Due to duration of symptoms, sinus tenderness, and failure of OTC therapies- ABX was written to tx. For bacterial etiology for sinusitis.   Discussed the conjunctivitis might be viral in nature- discard eye makeup.     Continue OTC supportive therapies- Flonase, OTC allergy meds, avoid night time dairy. Increase fluids. Humidification.    Patient given precautionary s/sx that mandate immediate follow up and evaluation in the ED. Advised of risks of not doing so.    DDX, Supportive care, and indications for immediate follow-up discussed with patient.    Instructed to return to clinic or nearest emergency department if we are not available for any change in condition, further concerns, or worsening of symptoms.    The patient demonstrated a good understanding and agreed with the treatment plan

## 2018-04-20 DIAGNOSIS — K22.719 BARRETT'S ESOPHAGUS WITH DYSPLASIA: ICD-10-CM

## 2018-04-20 DIAGNOSIS — Z98.890 S/P BALLOON DILATATION OF ESOPHAGEAL STRICTURE: ICD-10-CM

## 2018-04-20 NOTE — TELEPHONE ENCOUNTER
Was the patient seen in the last year in this department? No   Last seen 3/29/17  Does patient have an active prescription for medications requested? Yes     Received Request Via: Pharmacy

## 2018-04-22 RX ORDER — PANTOPRAZOLE SODIUM 40 MG/1
40 TABLET, DELAYED RELEASE ORAL DAILY
Qty: 90 TAB | Refills: 3 | Status: SHIPPED | OUTPATIENT
Start: 2018-04-22 | End: 2019-06-18 | Stop reason: SDUPTHER

## 2018-05-22 ENCOUNTER — OFFICE VISIT (OUTPATIENT)
Dept: MEDICAL GROUP | Facility: PHYSICIAN GROUP | Age: 58
End: 2018-05-22
Payer: COMMERCIAL

## 2018-05-22 VITALS
WEIGHT: 158 LBS | DIASTOLIC BLOOD PRESSURE: 68 MMHG | HEIGHT: 67 IN | BODY MASS INDEX: 24.8 KG/M2 | RESPIRATION RATE: 12 BRPM | TEMPERATURE: 97.2 F | OXYGEN SATURATION: 96 % | SYSTOLIC BLOOD PRESSURE: 134 MMHG

## 2018-05-22 DIAGNOSIS — J40 BRONCHITIS: ICD-10-CM

## 2018-05-22 DIAGNOSIS — R05.9 COUGH: ICD-10-CM

## 2018-05-22 DIAGNOSIS — J02.9 SORE THROAT: ICD-10-CM

## 2018-05-22 PROCEDURE — 99213 OFFICE O/P EST LOW 20 MIN: CPT | Performed by: FAMILY MEDICINE

## 2018-05-22 RX ORDER — AZITHROMYCIN 250 MG/1
TABLET, FILM COATED ORAL
Qty: 6 TAB | Refills: 0 | Status: SHIPPED | OUTPATIENT
Start: 2018-05-22 | End: 2018-06-22

## 2018-05-22 RX ORDER — CODEINE PHOSPHATE/GUAIFENESIN 10-100MG/5
5-10 LIQUID (ML) ORAL 3 TIMES DAILY PRN
Qty: 120 ML | Refills: 0 | Status: SHIPPED | OUTPATIENT
Start: 2018-05-22 | End: 2018-05-29

## 2018-05-22 ASSESSMENT — PATIENT HEALTH QUESTIONNAIRE - PHQ9: CLINICAL INTERPRETATION OF PHQ2 SCORE: 0

## 2018-05-22 NOTE — LETTER
May 22, 2018        Patient: Mei Sullivan   YOB: 1960   Date of Visit: 5/22/2018           To Whom It May Concern:    It is my medical opinion that Mei Sullivan needs to be released from work 5/23/18.    If you have any questions or concerns, please don't hesitate to call.        Sincerely,          Ji Costa M.D.  Electronically Signed    [address] [id number] [phone] (Phone)  [phone] (Fax)

## 2018-05-23 NOTE — PROGRESS NOTES
Patient comes in complaining of a cough with yellowish phlegm and the beginning of a hoarse voice.  She was sick in a similar way about a month ago and was given Augmentin as well as some antibiotic eyedrops for conjunctivitis and this helped her.  She does not have conjunctivitis now.  The patient went to [place] on a vacation and flew in an airplane before she had this present infection.  No one else is sick at home.      I reviewed the following    Past Medical History:   Diagnosis Date   • Arthritis     Osteo; both wrists   • Dyspnea    • Palpitations         Past Surgical History:   Procedure Laterality Date   • NEUROMA EXCISION  10/18/2013    Performed by FELIPE DoanPSIMIN at SURGERY SAME DAY [facility] ORS   • NEUROMA MORTONS EXCISION  1990    Left foot   • LAMINOTOMY  1985    coccyxectomy   • TONSILLECTOMY AND ADENOIDECTOMY  1972       Allergies   Allergen Reactions   • Fosamax      Esophageal Pain       Current Outpatient Prescriptions   Medication Sig Dispense Refill   • azithromycin (ZITHROMAX Z-MARGIE) 250 MG Tab Take 2 tabs on day 1 and then 1 tab a day for 4 more days until finished. 6 Tab 0   • guaifenesin-codeine (TUSSI-ORGANIDIN NR) 100-10 MG/5ML syrup Take 5-10 mL by mouth 3 times a day as needed for Cough for up to 7 days. 120 mL 0   • pantoprazole (PROTONIX) 40 MG Tablet Delayed Response Take 1 Tab by mouth every day. 90 Tab 3   • Protein POWD Take 2 Tbsp by mouth every 48 hours. Patient uses Isopure Powder in milk with banana and this helps hot flashes      • Calcium Carbonate-Vit D-Min (CALTRATE 600+D PLUS) 600-400 MG-UNIT TABS Take 2 Tabs by mouth every day.     • albuterol 108 (90 Base) MCG/ACT Aero Soln inhalation aerosol Inhale 2 Puffs by mouth every 6 hours as needed for Shortness of Breath. 8.5 g 0   • polymixin-trimethoprim (POLYTRIM) 42121-0.1 UNIT/ML-% Solution Use 1 gtt to both eyes q4 hours x 7 days. (Patient not taking: Reported on 5/22/2018) 5 mL 0   • naproxen (NAPROSYN)  500 MG Tab Take 1 Tab by mouth 2 times a day, with meals. 60 Tab 3   • fluticasone (FLONASE) 50 MCG/ACT nasal spray Spray 1 Spray in nose 2 times a day. 16 g 0   • zolpidem (AMBIEN) 5 MG Tab TAKE ONE-HALF TABLET BY MOUTH EVERY OTHER NIGHT 30 Tab 2   • cyclobenzaprine (FLEXERIL) 5 MG tablet Take 1-2 Tabs by mouth at bedtime as needed. 20 Tab 0     No current facility-administered medications for this visit.         Family History   Problem Relation Age of Onset   • Hypertension Father        Social History     Social History   • Marital status:      Spouse name: N/A   • Number of children: N/A   • Years of education: N/A     Occupational History   • Not on file.     Social History Main Topics   • Smoking status: Former Smoker     Packs/day: 2.00     Years: 27.00     Types: Cigarettes     Quit date: 10/13/2008   • Smokeless tobacco: Never Used   • Alcohol use No   • Drug use: No   • Sexual activity: Yes     Partners: Male     Birth control/ protection: Post-Menopausal     Other Topics Concern   • Not on file     Social History Narrative   • No narrative on file      The patient is well-developed well-nourished and in no acute distress    Pupils equally round and reactive to light    Ears normal    Nose normal    Throat clear    Neck supple no cervical adenopathy no thyromegaly    Chest scattered bilateral expiratory rhonchi    Heart regular rhythm no murmur S3 or S4 appreciated    Skin no rashes or suspicious lesions    1. Cough  azithromycin (ZITHROMAX Z-MARGIE) 250 MG Tab    guaifenesin-codeine (TUSSI-ORGANIDIN NR) 100-10 MG/5ML syrup   2. Sore throat  azithromycin (ZITHROMAX Z-MARGIE) 250 MG Tab   3. Bronchitis  azithromycin (ZITHROMAX Z-MARGIE) 250 MG Tab    guaifenesin-codeine (TUSSI-ORGANIDIN NR) 100-10 MG/5ML syrup     Given note to be off work tomorrow    Recheck as needed    Please note that this dictation was created using voice recognition software. I have worked with consultants from the vendor as well as  technical experts from [facility] to optimize the interface. I have made every reasonable attempt to correct obvious errors, but I expect that there are errors of grammar and possibly content that I did not discover before finalizing the note.

## 2018-05-23 NOTE — PATIENT INSTRUCTIONS
Patient given written instructions regarding  medications, referrals, dietary and lifestyle management, and return visit.    Ji Costa MD

## 2018-06-08 ENCOUNTER — HOSPITAL ENCOUNTER (OUTPATIENT)
Facility: MEDICAL CENTER | Age: 58
End: 2018-06-08
Payer: COMMERCIAL

## 2018-06-08 ENCOUNTER — HOSPITAL ENCOUNTER (OUTPATIENT)
Dept: LAB | Facility: MEDICAL CENTER | Age: 58
End: 2018-06-08
Payer: COMMERCIAL

## 2018-06-08 LAB
BDY FAT % MEASURED: 31.5 %
BP DIAS: 78 MMHG
BP SYS: 126 MMHG
CHOLEST SERPL-MCNC: 223 MG/DL (ref 100–199)
DIABETES HTDIA: NO
EVENT NAME HTEVT: NORMAL
FASTING HTFAS: YES
GLUCOSE SERPL-MCNC: 75 MG/DL (ref 65–99)
HDLC SERPL-MCNC: 64 MG/DL
HYPERTENSION HTHYP: NO
LDLC SERPL CALC-MCNC: 135 MG/DL
SCREENING LOC CITY HTCIT: NORMAL
SCREENING LOC STATE HTSTA: NORMAL
SCREENING LOCATION HTLOC: NORMAL
SMOKING HTSMO: NO
SUBSCRIBER ID HTSID: NORMAL
TRIGL SERPL-MCNC: 120 MG/DL (ref 0–149)

## 2018-06-08 PROCEDURE — 82947 ASSAY GLUCOSE BLOOD QUANT: CPT

## 2018-06-08 PROCEDURE — 80061 LIPID PANEL: CPT

## 2018-06-08 PROCEDURE — S5190 WELLNESS ASSESSMENT BY NONPH: HCPCS

## 2018-06-08 PROCEDURE — 36415 COLL VENOUS BLD VENIPUNCTURE: CPT

## 2018-06-11 DIAGNOSIS — E78.2 MIXED HYPERLIPIDEMIA: ICD-10-CM

## 2018-06-11 RX ORDER — PRAVASTATIN SODIUM 20 MG
20 TABLET ORAL
Qty: 90 TAB | Refills: 3 | Status: SHIPPED | OUTPATIENT
Start: 2018-06-11 | End: 2019-09-15 | Stop reason: SDUPTHER

## 2018-06-22 ENCOUNTER — APPOINTMENT (OUTPATIENT)
Dept: ADMISSIONS | Facility: MEDICAL CENTER | Age: 58
End: 2018-06-22
Attending: OPHTHALMOLOGY
Payer: COMMERCIAL

## 2018-06-26 ENCOUNTER — HOSPITAL ENCOUNTER (OUTPATIENT)
Facility: MEDICAL CENTER | Age: 58
End: 2018-06-26
Attending: OPHTHALMOLOGY | Admitting: OPHTHALMOLOGY
Payer: COMMERCIAL

## 2018-06-26 VITALS
BODY MASS INDEX: 24.98 KG/M2 | WEIGHT: 159.17 LBS | TEMPERATURE: 97.3 F | HEIGHT: 67 IN | DIASTOLIC BLOOD PRESSURE: 87 MMHG | SYSTOLIC BLOOD PRESSURE: 124 MMHG | OXYGEN SATURATION: 97 % | RESPIRATION RATE: 16 BRPM | HEART RATE: 70 BPM

## 2018-06-26 PROCEDURE — 160035 HCHG PACU - 1ST 60 MINS PHASE I: Performed by: OPHTHALMOLOGY

## 2018-06-26 PROCEDURE — 700101 HCHG RX REV CODE 250

## 2018-06-26 PROCEDURE — 502240 HCHG MISC OR SUPPLY RC 0272: Performed by: OPHTHALMOLOGY

## 2018-06-26 PROCEDURE — 501539 HCHG TIP, PHACO: Performed by: OPHTHALMOLOGY

## 2018-06-26 PROCEDURE — 501749 HCHG SHELL REV 276: Performed by: OPHTHALMOLOGY

## 2018-06-26 PROCEDURE — 160002 HCHG RECOVERY MINUTES (STAT): Performed by: OPHTHALMOLOGY

## 2018-06-26 PROCEDURE — 160029 HCHG SURGERY MINUTES - 1ST 30 MINS LEVEL 4: Performed by: OPHTHALMOLOGY

## 2018-06-26 PROCEDURE — 160048 HCHG OR STATISTICAL LEVEL 1-5: Performed by: OPHTHALMOLOGY

## 2018-06-26 PROCEDURE — 500855 HCHG NEEDLE, IRRIG CYSTOTOME 27G: Performed by: OPHTHALMOLOGY

## 2018-06-26 PROCEDURE — 700111 HCHG RX REV CODE 636 W/ 250 OVERRIDE (IP)

## 2018-06-26 PROCEDURE — 99152 MOD SED SAME PHYS/QHP 5/>YRS: CPT | Performed by: OPHTHALMOLOGY

## 2018-06-26 DEVICE — IMPLANTABLE DEVICE: Type: IMPLANTABLE DEVICE | Site: EYE | Status: FUNCTIONAL

## 2018-06-26 RX ORDER — MOXIFLOXACIN 5 MG/ML
SOLUTION/ DROPS OPHTHALMIC
Status: DISCONTINUED
Start: 2018-06-26 | End: 2018-06-26 | Stop reason: HOSPADM

## 2018-06-26 RX ORDER — SODIUM CHLORIDE, SODIUM LACTATE, POTASSIUM CHLORIDE, CALCIUM CHLORIDE 600; 310; 30; 20 MG/100ML; MG/100ML; MG/100ML; MG/100ML
INJECTION, SOLUTION INTRAVENOUS CONTINUOUS
Status: DISCONTINUED | OUTPATIENT
Start: 2018-06-26 | End: 2018-06-26 | Stop reason: HOSPADM

## 2018-06-26 RX ORDER — PHENYLEPHRINE HYDROCHLORIDE 25 MG/ML
SOLUTION/ DROPS OPHTHALMIC
Status: COMPLETED
Start: 2018-06-26 | End: 2018-06-26

## 2018-06-26 RX ORDER — TETRACAINE HYDROCHLORIDE 5 MG/ML
SOLUTION OPHTHALMIC
Status: COMPLETED
Start: 2018-06-26 | End: 2018-06-26

## 2018-06-26 RX ORDER — TROPICAMIDE 10 MG/ML
SOLUTION/ DROPS OPHTHALMIC
Status: COMPLETED
Start: 2018-06-26 | End: 2018-06-26

## 2018-06-26 RX ADMIN — PHENYLEPHRINE HYDROCHLORIDE 1 DROP: 2.5 SOLUTION/ DROPS OPHTHALMIC at 07:50

## 2018-06-26 RX ADMIN — TROPICAMIDE 1 DROP: 10 SOLUTION/ DROPS OPHTHALMIC at 07:50

## 2018-06-26 RX ADMIN — SODIUM CHLORIDE, SODIUM LACTATE, POTASSIUM CHLORIDE, CALCIUM CHLORIDE: 600; 310; 30; 20 INJECTION, SOLUTION INTRAVENOUS at 08:00

## 2018-06-26 RX ADMIN — TETRACAINE HYDROCHLORIDE 1 DROP: 5 SOLUTION OPHTHALMIC at 07:50

## 2018-06-26 ASSESSMENT — PAIN SCALES - GENERAL
PAINLEVEL_OUTOF10: 0

## 2018-06-26 NOTE — OP REPORT
DATE OF SERVICE:  06/26/2018    PREOPERATIVE DIAGNOSIS:  Cataract, left eye.    POSTOPERATIVE DIAGNOSIS:  Cataract, left eye.    PROCEDURE:  Phacoemulsification with intraocular lens implant, left eye.    ANESTHESIA:  Local MAC.    PROSTHETIC DEVICES:  [employer] intraocular lens, model AU00T0, 8.0 diopter, serial   #[id number].    INDICATIONS:  The patient has a visually significant cataract in the left eye.    DESCRIPTION OF OPERATION:  The patient was placed in the supine position on   the operating room table.  Appropriate anesthetic monitors were positioned.    The eye was prepped and draped in the usual sterile fashion for ocular surgery   using Betadine solution.  A wire lid speculum was positioned for exposure.  A   clear cornea temporal incision was made with a patsy keratome and a   paracentesis was made with a patsy knife.  The anterior chamber was filled   with viscoelastic and a continuous curvilinear capsulorrhexis was made with   the capsulorrhexis forceps.  The lens was hydrodissected and the nucleus was   removed using the phacoemulsification handpiece and the cortex was aspirated   with the IA handpiece.  The capsular bag was filled with viscoelastic and the   intraocular lens was positioned into the capsular bag.  Residual viscoelastic   was aspirated from the anterior chamber, and the wound was hydrated with   saline solution producing a watertight closure.  The wire lid speculum was   removed and the patient was taken to the recovery room in stable condition.       ____________________________________     MD EMILIA PORTER / TEDDY    DD:  06/26/2018 11:11:12  DT:  06/26/2018 11:16:05    D#:  [id number]  Job#:  [id number]

## 2018-06-26 NOTE — DISCHARGE INSTRUCTIONS
HOME CARE INSTRUCTIONS FOR CATARACT SURGERY    ACTIVITY: Rest and take it easy for the first 24 hours. We strongly suggest that a responsible adult remain with you during that time. It is normal to feel sleepy. We encourage you to not do anything that requires balance, judgment or coordination. Be extra careful when walking (with a dilated eye, it is easier to trip and fall).     FOR 24 HOURS, DO NOT:       Drive, operate machinery or run household appliances.        Drink beer or alcoholic beverages.        Make important decisions or sign legal documents.     DIET: To avoid nausea, slowly advance diet as tolerated, avoiding spicy or greasy foods for the first meal.     MEDICATIONS: Resume taking daily medication. You may take Tylenol for mild discomfort, if needed.     SURGICAL DRESSING: Eye shield as instructed by your doctor. Dark glasses should be worn while in the sunlight.     Follow your Physician's instruction Sheet. Eye Kit Given    A follow-up appointment is scheduled with your doctor tomorrow at ___1:15__pm.     You should call 911 if you develop problems with breathing or chest pain.  You should CALL YOUR PHYSICIAN if you develop: Sharp stabbing pain or sudden change in vision in your operative eye. If you are unable to contact your doctor or the surgical center, you should go to the nearest emergency room or urgent care center. Physician's telephone # _DR SHAH___[phone]______    If any questions arise, call your doctor. If your doctor is not available, please feel free to call Same Day Surgery at [phone]. You can also call the [employer] Hotline open 24 hours/day, 7 days/week and speak to a nurse at [phone] or [phone].     I acknowledge receipt and understanding of these Home Care Instructions.    ______________________________     _______________________________            Signature of Patient / Responsible Adult                                                       RN Signature    A  registered nurse may call you a few days after your surgery to see how you are doing.   You may also receive a survey in the mail within the next two weeks and we ask that you take a few moments to complete and return the survey. Our goal is to provide you with very good care and we value your comments. Thank you for choosing [facility].

## 2018-06-26 NOTE — OR NURSING
0905 Received pt from the OR with LILLIE Egan, in no signs of distress. Pt aware of POC. Left eye dilated, drinking sprite, denies pain    0910 Discharge instructions given to pt and family, both verbalize understanding.   Pt meets discharge criteria. Able to dress and steady on feet. Given eye kit.    0922 Pt discharged, ambulatory to car.  All questions/concerns addressed.

## 2018-07-10 ENCOUNTER — HOSPITAL ENCOUNTER (OUTPATIENT)
Facility: MEDICAL CENTER | Age: 58
End: 2018-07-10
Attending: OPHTHALMOLOGY | Admitting: OPHTHALMOLOGY
Payer: COMMERCIAL

## 2018-07-10 VITALS
WEIGHT: 160.05 LBS | TEMPERATURE: 97.4 F | OXYGEN SATURATION: 100 % | SYSTOLIC BLOOD PRESSURE: 114 MMHG | DIASTOLIC BLOOD PRESSURE: 79 MMHG | RESPIRATION RATE: 14 BRPM | HEART RATE: 81 BPM | HEIGHT: 67 IN | BODY MASS INDEX: 25.12 KG/M2

## 2018-07-10 PROCEDURE — 501749 HCHG SHELL REV 276: Performed by: OPHTHALMOLOGY

## 2018-07-10 PROCEDURE — 700101 HCHG RX REV CODE 250

## 2018-07-10 PROCEDURE — 160002 HCHG RECOVERY MINUTES (STAT): Performed by: OPHTHALMOLOGY

## 2018-07-10 PROCEDURE — 700111 HCHG RX REV CODE 636 W/ 250 OVERRIDE (IP)

## 2018-07-10 PROCEDURE — 160048 HCHG OR STATISTICAL LEVEL 1-5: Performed by: OPHTHALMOLOGY

## 2018-07-10 PROCEDURE — 99152 MOD SED SAME PHYS/QHP 5/>YRS: CPT | Performed by: OPHTHALMOLOGY

## 2018-07-10 PROCEDURE — 500855 HCHG NEEDLE, IRRIG CYSTOTOME 27G: Performed by: OPHTHALMOLOGY

## 2018-07-10 PROCEDURE — 502240 HCHG MISC OR SUPPLY RC 0272: Performed by: OPHTHALMOLOGY

## 2018-07-10 PROCEDURE — 501539 HCHG TIP, PHACO: Performed by: OPHTHALMOLOGY

## 2018-07-10 PROCEDURE — 160035 HCHG PACU - 1ST 60 MINS PHASE I: Performed by: OPHTHALMOLOGY

## 2018-07-10 PROCEDURE — 160029 HCHG SURGERY MINUTES - 1ST 30 MINS LEVEL 4: Performed by: OPHTHALMOLOGY

## 2018-07-10 DEVICE — IMPLANTABLE DEVICE: Type: IMPLANTABLE DEVICE | Site: EYE | Status: FUNCTIONAL

## 2018-07-10 RX ORDER — MOXIFLOXACIN 5 MG/ML
SOLUTION/ DROPS OPHTHALMIC
Status: DISCONTINUED
Start: 2018-07-10 | End: 2018-07-10 | Stop reason: HOSPADM

## 2018-07-10 RX ORDER — EPINEPHRINE 1 MG/ML
INJECTION INTRAMUSCULAR; INTRAVENOUS; SUBCUTANEOUS
Status: DISCONTINUED
Start: 2018-07-10 | End: 2018-07-10 | Stop reason: HOSPADM

## 2018-07-10 RX ORDER — TETRACAINE HYDROCHLORIDE 5 MG/ML
SOLUTION OPHTHALMIC
Status: COMPLETED
Start: 2018-07-10 | End: 2018-07-10

## 2018-07-10 RX ORDER — TROPICAMIDE 10 MG/ML
SOLUTION/ DROPS OPHTHALMIC
Status: COMPLETED
Start: 2018-07-10 | End: 2018-07-10

## 2018-07-10 RX ORDER — PHENYLEPHRINE HYDROCHLORIDE 25 MG/ML
SOLUTION/ DROPS OPHTHALMIC
Status: COMPLETED
Start: 2018-07-10 | End: 2018-07-10

## 2018-07-10 RX ORDER — SODIUM CHLORIDE, SODIUM LACTATE, POTASSIUM CHLORIDE, CALCIUM CHLORIDE 600; 310; 30; 20 MG/100ML; MG/100ML; MG/100ML; MG/100ML
INJECTION, SOLUTION INTRAVENOUS CONTINUOUS
Status: DISCONTINUED | OUTPATIENT
Start: 2018-07-10 | End: 2018-07-10 | Stop reason: HOSPADM

## 2018-07-10 RX ADMIN — SODIUM CHLORIDE, SODIUM LACTATE, POTASSIUM CHLORIDE, CALCIUM CHLORIDE: 600; 310; 30; 20 INJECTION, SOLUTION INTRAVENOUS at 08:25

## 2018-07-10 RX ADMIN — TROPICAMIDE 1 DROP: 10 SOLUTION/ DROPS OPHTHALMIC at 08:30

## 2018-07-10 RX ADMIN — TETRACAINE HYDROCHLORIDE 1 DROP: 5 SOLUTION OPHTHALMIC at 08:30

## 2018-07-10 RX ADMIN — PHENYLEPHRINE HYDROCHLORIDE 1 DROP: 2.5 SOLUTION/ DROPS OPHTHALMIC at 08:30

## 2018-07-10 ASSESSMENT — PAIN SCALES - GENERAL
PAINLEVEL_OUTOF10: 0

## 2018-07-10 NOTE — OP REPORT
DATE OF SERVICE:  07/10/2018    PREOPERATIVE DIAGNOSIS:  Cataract, right eye.    POSTOPERATIVE DIAGNOSIS:  Cataract, right eye.    PROCEDURE:  Phacoemulsification with intraocular lens implant, right eye.    SURGEON:  Martell Rai MD    ANESTHESIA:  Local MAC.    PROSTHETIC DEVICES:  [employer] intraocular lens, model AU00T0, 11.5 diopter,   serial [id number].    INDICATIONS:  The patient has a visually significant cataract in the right   eye.    DESCRIPTION OF OPERATION:  The patient was placed in the supine position on   the operating room table.  Appropriate anesthetic monitors were positioned.    The eye was prepped and draped in the usual sterile fashion for ocular surgery   using Betadine solution.  A wire lid speculum was positioned for exposure.  A   clear cornea temporal incision was made with a patsy keratome and a   paracentesis was made with a patsy knife.  The anterior chamber was filled   with viscoelastic and a continuous curvilinear capsulorrhexis was made with   the capsulorrhexis forceps.  The lens was hydrodissected and the nucleus was   removed using the phacoemulsification handpiece and the cortex was aspirated   with the IA handpiece.  The capsular bag was filled with viscoelastic and the   intraocular lens was positioned into the capsular bag.  Residual viscoelastic   was aspirated from the anterior chamber, and the wound was hydrated with   saline solution producing a watertight closure.  The wire lid speculum was   removed and the patient was taken to the recovery room in stable condition.       ____________________________________     MD EMILIA PORTER / NTS    DD:  07/10/2018 10:13:06  DT:  07/10/2018 10:19:07    D#:  [id number]  Job#:  [id number]

## 2018-07-10 NOTE — OR NURSING
0940 Received pt from the OR with Dr Wray, right eye dilated.  VSS, in no signs of distress. Pt aware of POC. Pt denies pain.    0950 Discharge instructions given to pt and family, both verbalize understanding.   Pt meets discharge criteria. Steady on feet.    1000 Pt discharged, ambulatory to car.  All questions/concerns addressed.

## 2018-07-10 NOTE — DISCHARGE INSTRUCTIONS
HOME CARE INSTRUCTIONS FOR CATARACT SURGERY    ACTIVITY: Rest and take it easy for the first 24 hours. We strongly suggest that a responsible adult remain with you during that time. It is normal to feel sleepy. We encourage you to not do anything that requires balance, judgment or coordination. Be extra careful when walking (with a dilated eye, it is easier to trip and fall).     FOR 24 HOURS, DO NOT:       Drive, operate machinery or run household appliances.        Drink beer or alcoholic beverages.        Make important decisions or sign legal documents.     DIET: To avoid nausea, slowly advance diet as tolerated, avoiding spicy or greasy foods for the first meal.     MEDICATIONS: Resume taking daily medication. You may take Tylenol for mild discomfort, if needed.     SURGICAL DRESSING: Eye shield as instructed by your doctor. Dark glasses should be worn while in the sunlight.     Follow your Physician's instruction Sheet. Eye Kit Given    A follow-up appointment is scheduled with your doctor tomorrow at __10:15am.     You should call 911 if you develop problems with breathing or chest pain.  You should CALL YOUR PHYSICIAN if you develop: Sharp stabbing pain or sudden change in vision in your operative eye. If you are unable to contact your doctor or the surgical center, you should go to the nearest emergency room or urgent care center. Physician's telephone # _dr castillo [phone]______    If any questions arise, call your doctor. If your doctor is not available, please feel free to call Same Day Surgery at [phone]. You can also call the [employer] Hotline open 24 hours/day, 7 days/week and speak to a nurse at [phone] or [phone].     I acknowledge receipt and understanding of these Home Care Instructions.    ______________________________     _______________________________            Signature of Patient / Responsible Adult                                                       RN Signature    A  registered nurse may call you a few days after your surgery to see how you are doing.   You may also receive a survey in the mail within the next two weeks and we ask that you take a few moments to complete and return the survey. Our goal is to provide you with very good care and we value your comments. Thank you for choosing [facility].

## 2018-08-14 ENCOUNTER — HOSPITAL ENCOUNTER (OUTPATIENT)
Dept: RADIOLOGY | Facility: MEDICAL CENTER | Age: 58
End: 2018-08-14
Attending: FAMILY MEDICINE
Payer: COMMERCIAL

## 2018-08-14 ENCOUNTER — OFFICE VISIT (OUTPATIENT)
Dept: MEDICAL GROUP | Facility: PHYSICIAN GROUP | Age: 58
End: 2018-08-14
Payer: COMMERCIAL

## 2018-08-14 VITALS
DIASTOLIC BLOOD PRESSURE: 64 MMHG | TEMPERATURE: 98.2 F | SYSTOLIC BLOOD PRESSURE: 118 MMHG | OXYGEN SATURATION: 94 % | HEIGHT: 67 IN | HEART RATE: 80 BPM | BODY MASS INDEX: 24.64 KG/M2 | RESPIRATION RATE: 16 BRPM | WEIGHT: 157 LBS

## 2018-08-14 DIAGNOSIS — M25.561 ACUTE PAIN OF RIGHT KNEE: ICD-10-CM

## 2018-08-14 DIAGNOSIS — E78.2 MIXED HYPERLIPIDEMIA: ICD-10-CM

## 2018-08-14 DIAGNOSIS — M25.552 LEFT HIP PAIN: ICD-10-CM

## 2018-08-14 PROCEDURE — 73502 X-RAY EXAM HIP UNI 2-3 VIEWS: CPT | Mod: LT

## 2018-08-14 PROCEDURE — 99213 OFFICE O/P EST LOW 20 MIN: CPT | Performed by: FAMILY MEDICINE

## 2018-08-14 PROCEDURE — 73564 X-RAY EXAM KNEE 4 OR MORE: CPT | Mod: RT

## 2018-08-14 RX ORDER — ETODOLAC 400 MG/1
400 TABLET, FILM COATED ORAL 2 TIMES DAILY
Qty: 30 TAB | Refills: 3 | Status: SHIPPED | OUTPATIENT
Start: 2018-08-14 | End: 2019-11-20 | Stop reason: SDUPTHER

## 2018-08-15 NOTE — PROGRESS NOTES
Patient comes in to follow-up on her cholesterol which was elevated when she had healthy tracts blood test done 2 months ago.  She is taking pravastatin 20 mg with no known side effects.  She complains of pain over her right knee when she kneels on the knee she denies any specific injury.  It has bothered her for several months.  She also complains of pain over the left hip which has bothered her for several months.  She has no specific history of injury to her left hip.    I reviewed the following    Past Medical History:   Diagnosis Date   • Arthritis     Osteo; both wrists   • Cataract    • Dyspnea    • Heart burn    • High cholesterol    • Indigestion    • Palpitations    • Pneumonia 1989        Past Surgical History:   Procedure Laterality Date   • CATARACT PHACO WITH IOL Right 7/10/2018    Procedure: CATARACT PHACO WITH IOL;  Surgeon: Martell Rai M.D.;  Location: SURGERY SAME DAY [facility];  Service: Ophthalmology   • CATARACT PHACO WITH IOL Left 6/26/2018    Procedure: CATARACT PHACO WITH IOL;  Surgeon: Martell Rai M.D.;  Location: SURGERY SAME DAY [facility];  Service: Ophthalmology   • NEUROMA EXCISION  10/18/2013    Performed by RUEL Doan.P.MJesusita at SURGERY SAME DAY [facility]   • NEUROMA MORTONS EXCISION  1990    Left foot   • LAMINOTOMY  1985    coccyxectomy   • LAPAROSCOPY  1982   • TONSILLECTOMY AND ADENOIDECTOMY  1972       Allergies   Allergen Reactions   • Fosamax      Esophageal Pain       Current Outpatient Prescriptions   Medication Sig Dispense Refill   • etodolac (LODINE) 400 MG tablet Take 1 Tab by mouth 2 times a day. Take with food 30 Tab 3   • pravastatin (PRAVACHOL) 20 MG Tab Take 1 Tab by mouth every bedtime. 90 Tab 3   • pantoprazole (PROTONIX) 40 MG Tablet Delayed Response Take 1 Tab by mouth every day. 90 Tab 3   • Protein POWD Take 2 Tbsp by mouth every 48 hours. Patient uses Isopure Powder in milk with banana and this helps hot flashes      • Calcium  Carbonate-Vit D-Min (CALTRATE 600+D PLUS) 600-400 MG-UNIT TABS Take 2 Tabs by mouth every day.       No current facility-administered medications for this visit.         Family History   Problem Relation Age of Onset   • Hypertension Father        Social History     Social History   • Marital status:      Spouse name: N/A   • Number of children: N/A   • Years of education: N/A     Occupational History   • Not on file.     Social History Main Topics   • Smoking status: Former Smoker     Packs/day: 2.00     Years: 25.00     Types: Cigarettes     Quit date: 1/1/2009   • Smokeless tobacco: Never Used   • Alcohol use No   • Drug use: No   • Sexual activity: Yes     Partners: Male     Birth control/ protection: Post-Menopausal     Other Topics Concern   • Not on file     Social History Narrative   • No narrative on file      The patient is well-developed well-nourished and in no acute distress    Pupils equally round and reactive to light    Ears normal    Nose normal    Throat clear    Neck supple no cervical adenopathy no thyromegaly    Chest clear to auscultation    Heart regular rhythm no murmur S3 or S4 appreciated    Back no CVA pain or spasm    Abdomen flat soft good bowel sounds no mass No hepatosplenomegaly no rebound    Skin no rashes or suspicious lesions    DTRs 2+ in ankles knees and biceps    Babinski downgoing bilaterally    Pulses 2+ in all 4 extremities right knee is 2+ tender over the infrapatellar tendon area.--- Negative anterior and posterior drawer signs.  Negative Lv's test.  Left hip is 1+ tender over the left hip joint especially with internal and external rotation.    1. Mixed hyperlipidemia  COMP METABOLIC PANEL    LIPID PROFILE   2. Acute pain of right knee  DX-KNEE COMPLETE 4+ RIGHT    etodolac (LODINE) 400 MG tablet--1 p.o. twice daily with food   3. Left hip pain  DX-HIP-COMPLETE - UNILATERAL 2+ LEFT     Recheck as needed above results    Please note that this dictation was  created using voice recognition software. I have worked with consultants from the vendor as well as technical experts from [employer] [facility] to optimize the interface. I have made every reasonable attempt to correct obvious errors, but I expect that there are errors of grammar and possibly content that I did not discover before finalizing the note.

## 2018-09-25 ENCOUNTER — IMMUNIZATION (OUTPATIENT)
Dept: OCCUPATIONAL MEDICINE | Facility: CLINIC | Age: 58
End: 2018-09-25

## 2018-09-25 DIAGNOSIS — Z23 FLU VACCINE NEED: Primary | ICD-10-CM

## 2018-09-25 PROCEDURE — 90686 IIV4 VACC NO PRSV 0.5 ML IM: CPT | Performed by: PREVENTIVE MEDICINE

## 2018-10-25 DIAGNOSIS — Z87.891 HISTORY OF CIGARETTE SMOKING: ICD-10-CM

## 2018-11-20 DIAGNOSIS — M79.672 PAIN IN BOTH FEET: ICD-10-CM

## 2018-11-20 DIAGNOSIS — M79.671 PAIN IN BOTH FEET: ICD-10-CM

## 2018-11-27 ENCOUNTER — TELEPHONE (OUTPATIENT)
Dept: OTHER | Facility: IMAGING CENTER | Age: 58
End: 2018-11-27

## 2018-11-27 ENCOUNTER — TELEPHONE (OUTPATIENT)
Dept: HEMATOLOGY ONCOLOGY | Facility: MEDICAL CENTER | Age: 58
End: 2018-11-27

## 2018-11-27 NOTE — TELEPHONE ENCOUNTER
Received referral to lung cancer screening program.  Chart review to assess for lung cancer screening program eligibility.   1. Age 55-77 yrs of age? Yes 58 y.o.  2. 30 pack year hx of smoking, or greater? Yes 2 [id number]= [id number] hx  3. Current smoker or if quit, has pt quit within last 15 yrs?Yes, quit 2009  4. Any signs or symptoms of lung cancer? None noted  5. Previous history of lung cancer? None noted  6. Chest CT within past 12 mos.? None noted  Patient does meet eligibility criteria. [facility] scheduling notified to schedule the shared decision making visit.

## 2018-12-04 DIAGNOSIS — Z12.2 ENCOUNTER FOR SCREENING FOR LUNG CANCER: ICD-10-CM

## 2018-12-14 ENCOUNTER — TELEPHONE (OUTPATIENT)
Dept: MEDICAL GROUP | Facility: PHYSICIAN GROUP | Age: 58
End: 2018-12-14

## 2018-12-15 NOTE — TELEPHONE ENCOUNTER
VOICEMAIL  1. Caller Name: Mei Sullivan                      Call Back Number: [phone] (home)     2. Message: Left a message for Mei to call back to see if she contacted the correct place.    3. Patient approves office to leave a detailed voicemail/MyChart message: N\A

## 2018-12-15 NOTE — TELEPHONE ENCOUNTER
----- Message from Maribel Rivera sent at 12/14/2018  3:43 PM PST -----  Regarding: FW: Customer Service: Referrals  Contact: [phone]      ----- Message -----  From: Mei Sullivan  Sent: 12/14/2018   3:42 PM  To: Amb All [facility]  Subject: Customer Service: Referrals                      Customer Service request regarding: Referrals   regarding member: MEI SULLIVAN [   [id number]]    -Note: Referrals are displayed in the format:Internal ID [External ID]    Referral #: [id number] [[id number]]    Comment:  Hi,  They do not take [employer] [facility] [employer].  Is there a provider who takes our insurance?    Thank you

## 2018-12-19 NOTE — TELEPHONE ENCOUNTER
Phone Number Called: [phone] (home)       Message: pt is going to call her insurance and see where she can go that is covered  and will call back for a referral       Left Message for patient to call back: N\A

## 2018-12-21 ENCOUNTER — TELEPHONE (OUTPATIENT)
Dept: MEDICAL GROUP | Facility: PHYSICIAN GROUP | Age: 58
End: 2018-12-21

## 2018-12-21 DIAGNOSIS — M79.672 PAIN IN BOTH FEET: ICD-10-CM

## 2018-12-21 DIAGNOSIS — M79.671 PAIN IN BOTH FEET: ICD-10-CM

## 2018-12-21 NOTE — TELEPHONE ENCOUNTER
2. SPECIFIC Action To Be Taken: Referral pending, please sign.    3. Diagnosis/Clinical Reason for Request: M79.671,M79.672 (ICD-10-CM) - Pain in both feet    4. Specialty & Provider Name/Lab/Imaging Location: Agnes Moser (or any doctor) at [address] [place] [address]      5. Is appointment scheduled for requested order/referral: no    Patient was not informed they will receive a return phone call from the office ONLY if there are any questions before processing their request. Advised to call back if they haven't received a call from the referral department in 5 days.

## 2019-01-03 ENCOUNTER — OFFICE VISIT (OUTPATIENT)
Dept: URGENT CARE | Facility: PHYSICIAN GROUP | Age: 59
End: 2019-01-03
Payer: COMMERCIAL

## 2019-01-03 VITALS
DIASTOLIC BLOOD PRESSURE: 62 MMHG | HEART RATE: 87 BPM | WEIGHT: 155 LBS | SYSTOLIC BLOOD PRESSURE: 110 MMHG | HEIGHT: 67 IN | BODY MASS INDEX: 24.33 KG/M2 | OXYGEN SATURATION: 98 % | RESPIRATION RATE: 18 BRPM | TEMPERATURE: 99.1 F

## 2019-01-03 DIAGNOSIS — J98.8 RTI (RESPIRATORY TRACT INFECTION): ICD-10-CM

## 2019-01-03 PROCEDURE — 99214 OFFICE O/P EST MOD 30 MIN: CPT | Performed by: FAMILY MEDICINE

## 2019-01-03 RX ORDER — CODEINE PHOSPHATE AND GUAIFENESIN 10; 100 MG/5ML; MG/5ML
10 SOLUTION ORAL EVERY 6 HOURS PRN
Qty: 200 ML | Refills: 0 | Status: SHIPPED | OUTPATIENT
Start: 2019-01-03 | End: 2019-01-13

## 2019-01-03 RX ORDER — AZITHROMYCIN 250 MG/1
TABLET, FILM COATED ORAL
Qty: 6 TAB | Refills: 0 | Status: SHIPPED | OUTPATIENT
Start: 2019-01-03 | End: 2019-04-09

## 2019-01-03 RX ORDER — PREDNISONE 10 MG/1
30 TABLET ORAL EVERY MORNING
Qty: 21 TAB | Refills: 0 | Status: SHIPPED | OUTPATIENT
Start: 2019-01-03 | End: 2019-01-10

## 2019-01-03 ASSESSMENT — ENCOUNTER SYMPTOMS
DIZZINESS: 0
CHILLS: 0
SHORTNESS OF BREATH: 0
ORTHOPNEA: 0
FEVER: 0
SORE THROAT: 1
COUGH: 1
FOCAL WEAKNESS: 0
HEMOPTYSIS: 0

## 2019-01-03 NOTE — LETTER
January 3, 2019         Patient: Mei Sullivan   YOB: 1960   Date of Visit: 1/3/2019           To Whom it May Concern:    Mei Sullivan was seen in my clinic on 1/3/2019. She may return to work in 1-2 days, excuse recent missed days work in past week.    If you have any questions or concerns, please don't hesitate to call.        Sincerely,           Don Velez M.D.  Electronically Signed

## 2019-01-03 NOTE — PROGRESS NOTES
Subjective:      Mei Sullivan is a 58 y.o. female who presents with Cough (x 5 days// sinus pain// chest congestion )      - This is a very pleasant, well and non-toxic appearing 58 y.o. female with complaints of 5 days w/ cough/sinus/sore throat. No NVFC. otc meds not helping           ALLERGIES:  Fosamax     PMH:  Past Medical History:   Diagnosis Date   • Arthritis     Osteo; both wrists   • Cataract    • Dyspnea    • Heart burn    • High cholesterol    • Indigestion    • Palpitations    • Pneumonia 1989        MEDS:    Current Outpatient Prescriptions:   •  azithromycin (ZITHROMAX) 250 MG Tab, Use as directed, Disp: 6 Tab, Rfl: 0  •  guaifenesin-codeine (ROBITUSSIN AC) Solution oral solution, Take 10 mL by mouth every 6 hours as needed for Cough for up to 10 days., Disp: 200 mL, Rfl: 0  •  predniSONE (DELTASONE) 10 MG Tab, Take 3 Tabs by mouth every morning for 7 days., Disp: 21 Tab, Rfl: 0  •  pravastatin (PRAVACHOL) 20 MG Tab, Take 1 Tab by mouth every bedtime., Disp: 90 Tab, Rfl: 3  •  pantoprazole (PROTONIX) 40 MG Tablet Delayed Response, Take 1 Tab by mouth every day., Disp: 90 Tab, Rfl: 3  •  etodolac (LODINE) 400 MG tablet, Take 1 Tab by mouth 2 times a day. Take with food (Patient not taking: Reported on 1/3/2019), Disp: 30 Tab, Rfl: 3  •  Protein POWD, Take 2 Tbsp by mouth every 48 hours. Patient uses Isopure Powder in milk with banana and this helps hot flashes , Disp: , Rfl:   •  Calcium Carbonate-Vit D-Min (CALTRATE 600+D PLUS) 600-400 MG-UNIT TABS, Take 2 Tabs by mouth every day., Disp: , Rfl:     ** I have documented what I find to be significant in regards to past medical, social, family and surgical history  in my HPI or under PMH/PSH/FH review section, otherwise it is contributory **           HPI    Review of Systems   Constitutional: Negative for chills and fever.   HENT: Positive for congestion and sore throat.    Respiratory: Positive for cough. Negative for hemoptysis and  "shortness of breath.    Cardiovascular: Negative for chest pain and orthopnea.   Neurological: Negative for dizziness and focal weakness.          Objective:     /62   Pulse 87   Temp 37.3 °C (99.1 °F)   Resp 18   Ht 1.702 m (5' 7\")   Wt 70.3 kg (155 lb)   LMP 08/01/2006   SpO2 98%   BMI 24.28 kg/m²      Physical Exam   Constitutional: She appears well-developed. No distress.   HENT:   Head: Normocephalic and atraumatic.   Mouth/Throat: Oropharynx is clear and moist.   Eyes: Conjunctivae are normal.   Neck: Neck supple.   Cardiovascular: Regular rhythm.    No murmur heard.  Pulmonary/Chest: Effort normal and breath sounds normal. No respiratory distress.   Neurological: She is alert. She exhibits normal muscle tone.   Skin: Skin is warm and dry.   Psychiatric: She has a normal mood and affect. Judgment normal.   Nursing note and vitals reviewed.              Assessment/Plan:         1. RTI (respiratory tract infection)  azithromycin (ZITHROMAX) 250 MG Tab    guaifenesin-codeine (ROBITUSSIN AC) Solution oral solution    predniSONE (DELTASONE) 10 MG Tab             Dx & d/c instructions discussed w/ patient and/or family members.     ER precautions (worsening signs symptoms and when to go to ER) discussed.    Follow up w/ PCP in 2-3 days to make sure symptoms improving and no further intervention/treatment and/or work-up needed was advised, ER if feeling worse or not improving in 2 days.    Possible side effects (i.e. Rash, GI upset/constipation, sedation, elevation of BP or sugars) of any medications given discussed.     Patient left in stable condition              "

## 2019-01-07 ENCOUNTER — PATIENT OUTREACH (OUTPATIENT)
Dept: OTHER | Facility: MEDICAL CENTER | Age: 59
End: 2019-01-07

## 2019-01-31 ENCOUNTER — HOSPITAL ENCOUNTER (OUTPATIENT)
Dept: RADIOLOGY | Facility: MEDICAL CENTER | Age: 59
End: 2019-01-31
Attending: FAMILY MEDICINE
Payer: COMMERCIAL

## 2019-01-31 DIAGNOSIS — Z12.39 ENCOUNTER FOR SCREENING BREAST EXAMINATION: ICD-10-CM

## 2019-01-31 PROCEDURE — 77063 BREAST TOMOSYNTHESIS BI: CPT

## 2019-02-04 ENCOUNTER — HOSPITAL ENCOUNTER (OUTPATIENT)
Dept: RADIOLOGY | Facility: MEDICAL CENTER | Age: 59
End: 2019-02-04
Attending: FAMILY MEDICINE
Payer: COMMERCIAL

## 2019-02-04 DIAGNOSIS — Z12.2 ENCOUNTER FOR SCREENING FOR LUNG CANCER: ICD-10-CM

## 2019-02-04 PROCEDURE — G0297 LDCT FOR LUNG CA SCREEN: HCPCS

## 2019-04-09 ENCOUNTER — OFFICE VISIT (OUTPATIENT)
Dept: URGENT CARE | Facility: PHYSICIAN GROUP | Age: 59
End: 2019-04-09
Payer: COMMERCIAL

## 2019-04-09 ENCOUNTER — HOSPITAL ENCOUNTER (OUTPATIENT)
Dept: RADIOLOGY | Facility: MEDICAL CENTER | Age: 59
End: 2019-04-09
Attending: PHYSICIAN ASSISTANT
Payer: COMMERCIAL

## 2019-04-09 VITALS
DIASTOLIC BLOOD PRESSURE: 80 MMHG | HEIGHT: 67 IN | OXYGEN SATURATION: 98 % | HEART RATE: 76 BPM | TEMPERATURE: 97.4 F | WEIGHT: 156 LBS | SYSTOLIC BLOOD PRESSURE: 124 MMHG | BODY MASS INDEX: 24.48 KG/M2

## 2019-04-09 DIAGNOSIS — M25.561 ACUTE PAIN OF RIGHT KNEE: ICD-10-CM
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
## 2019-04-09 DIAGNOSIS — S80.01XA CONTUSION OF RIGHT KNEE, INITIAL ENCOUNTER: ICD-10-CM

## 2019-04-09 PROCEDURE — 73562 X-RAY EXAM OF KNEE 3: CPT | Mod: RT

## 2019-04-09 PROCEDURE — 99213 OFFICE O/P EST LOW 20 MIN: CPT | Performed by: PHYSICIAN ASSISTANT

## 2019-04-09 ASSESSMENT — ENCOUNTER SYMPTOMS
NUMBNESS: 0
FEVER: 0
VOMITING: 0
SENSORY CHANGE: 0
SHORTNESS OF BREATH: 0
CHILLS: 0
HEADACHES: 0
VISUAL CHANGE: 0
TINGLING: 0
LOSS OF CONSCIOUSNESS: 0
NAUSEA: 0

## 2019-04-10 NOTE — PROGRESS NOTES
Subjective:   Mei Sullivan is a 59 y.o. female who presents for Fall (x 3 days right knee pain and swelling)       Fall   The accident occurred 3 to 5 days ago. Fall occurred: down stairs. She landed on hard floor. There was no blood loss. The point of impact was the right knee. The pain is present in the right knee. The pain is moderate. The symptoms are aggravated by pressure on injury (walking up stairs). Pertinent negatives include no fever, headaches, loss of consciousness, nausea, numbness, tingling, visual change or vomiting. She has tried nothing for the symptoms. The treatment provided no relief.     Review of Systems   Constitutional: Negative for chills and fever.   Respiratory: Negative for shortness of breath.    Cardiovascular: Negative for chest pain.   Gastrointestinal: Negative for nausea and vomiting.   Musculoskeletal:        Positive for right knee pain   Neurological: Negative for tingling, sensory change, loss of consciousness, numbness and headaches.   All other systems reviewed and are negative.      PMH:  has a past medical history of Arthritis; Cataract; Dyspnea; Heart burn; High cholesterol; Indigestion; Palpitations; and Pneumonia (1989).    MEDS:   Current Outpatient Prescriptions:   •  pravastatin (PRAVACHOL) 20 MG Tab, Take 1 Tab by mouth every bedtime., Disp: 90 Tab, Rfl: 3  •  pantoprazole (PROTONIX) 40 MG Tablet Delayed Response, Take 1 Tab by mouth every day., Disp: 90 Tab, Rfl: 3  •  Protein POWD, Take 2 Tbsp by mouth every 48 hours. Patient uses Isopure Powder in milk with banana and this helps hot flashes , Disp: , Rfl:   •  Calcium Carbonate-Vit D-Min (CALTRATE 600+D PLUS) 600-400 MG-UNIT TABS, Take 2 Tabs by mouth every day., Disp: , Rfl:   •  etodolac (LODINE) 400 MG tablet, Take 1 Tab by mouth 2 times a day. Take with food (Patient not taking: Reported on 1/3/2019), Disp: 30 Tab, Rfl: 3    ALLERGIES:   Allergies   Allergen Reactions   • Fosamax      Esophageal Pain  "      SURGHX:   Past Surgical History:   Procedure Laterality Date   • CATARACT PHACO WITH IOL Right 7/10/2018    Procedure: CATARACT PHACO WITH IOL;  Surgeon: Martell Rai M.D.;  Location: SURGERY SAME DAY [facility];  Service: Ophthalmology   • CATARACT PHACO WITH IOL Left 6/26/2018    Procedure: CATARACT PHACO WITH IOL;  Surgeon: Martell Rai M.D.;  Location: SURGERY SAME DAY [facility];  Service: Ophthalmology   • NEUROMA EXCISION  10/18/2013    Performed by Jesenia Baxter D.P.M. at SURGERY SAME DAY [facility]   • NEUROMA MORTONS EXCISION  1990    Left foot   • LAMINOTOMY  1985    coccyxectomy   • LAPAROSCOPY  1982   • TONSILLECTOMY AND ADENOIDECTOMY  1972       SOCHX:  reports that she quit smoking about 10 years ago. Her smoking use included Cigarettes. She has a 50.00 pack-year smoking history. She has never used smokeless tobacco. She reports that she does not drink alcohol or use drugs.    FH: Reviewed with patient, not pertinent to this visit.     Objective:   /80   Pulse 76   Temp 36.3 °C (97.4 °F) (Temporal)   Ht 1.702 m (5' 7\")   Wt 70.8 kg (156 lb)   LMP 08/01/2006   SpO2 98%   BMI 24.43 kg/m²   Physical Exam   Constitutional: She is oriented to person, place, and time. She appears well-developed and well-nourished. No distress.   HENT:   Head: Normocephalic and atraumatic.   Nose: Nose normal.   Eyes: Conjunctivae and EOM are normal.   Neck: Normal range of motion. No tracheal deviation present.   Cardiovascular: Normal rate, regular rhythm and intact distal pulses.    Pulmonary/Chest: Effort normal. No respiratory distress.   Musculoskeletal:        Right knee: She exhibits swelling, ecchymosis and bony tenderness. She exhibits normal range of motion, no deformity, no laceration, no erythema, normal alignment, no LCL laxity, normal patellar mobility, normal meniscus and no MCL laxity. Tenderness found. Patellar tendon tenderness noted. No medial joint line, no " lateral joint line, no MCL and no LCL tenderness noted.   ROM normal all four extremities   Neurological: She is alert and oriented to person, place, and time.   Skin: Skin is warm and dry. Capillary refill takes less than 2 seconds.   Psychiatric: She has a normal mood and affect. Her behavior is normal. Judgment and thought content normal.   Vitals reviewed.    - DX-KNEE 3 VIEWS RIGHT   Impression: No evidence of acute fracture or dislocation.    Assessment/Plan:   1. Contusion of right knee, initial encounter  - DX-KNEE 3 VIEWS RIGHT; Future    2. Fall, initial encounter    - Advised to try OTC ibuprofen/acetaminophen prn  - Advised to elevate and apply ice to right knee  - Advised to return if symptoms worsen or do not improve    Differential diagnosis, natural history, supportive care, and indications for immediate follow-up discussed.

## 2019-06-05 ENCOUNTER — APPOINTMENT (RX ONLY)
Dept: URBAN - METROPOLITAN AREA CLINIC 22 | Facility: CLINIC | Age: 59
Setting detail: DERMATOLOGY
End: 2019-06-05

## 2019-06-05 DIAGNOSIS — L81.4 OTHER MELANIN HYPERPIGMENTATION: ICD-10-CM

## 2019-06-05 DIAGNOSIS — D18.0 HEMANGIOMA: ICD-10-CM

## 2019-06-05 DIAGNOSIS — D22 MELANOCYTIC NEVI: ICD-10-CM

## 2019-06-05 DIAGNOSIS — Z71.89 OTHER SPECIFIED COUNSELING: ICD-10-CM

## 2019-06-05 DIAGNOSIS — L82.1 OTHER SEBORRHEIC KERATOSIS: ICD-10-CM

## 2019-06-05 PROBLEM — K21.9 GASTRO-ESOPHAGEAL REFLUX DISEASE WITHOUT ESOPHAGITIS: Status: ACTIVE | Noted: 2019-06-05

## 2019-06-05 PROBLEM — D18.01 HEMANGIOMA OF SKIN AND SUBCUTANEOUS TISSUE: Status: ACTIVE | Noted: 2019-06-05

## 2019-06-05 PROBLEM — D22.5 MELANOCYTIC NEVI OF TRUNK: Status: ACTIVE | Noted: 2019-06-05

## 2019-06-05 PROBLEM — D48.5 NEOPLASM OF UNCERTAIN BEHAVIOR OF SKIN: Status: ACTIVE | Noted: 2019-06-05

## 2019-06-05 PROCEDURE — ? REFERRAL CORRESPONDENCE

## 2019-06-05 PROCEDURE — ? BIOPSY BY SHAVE METHOD

## 2019-06-05 PROCEDURE — 11102 TANGNTL BX SKIN SINGLE LES: CPT

## 2019-06-05 PROCEDURE — ? COUNSELING

## 2019-06-05 PROCEDURE — 99214 OFFICE O/P EST MOD 30 MIN: CPT | Mod: 25

## 2019-06-05 PROCEDURE — 11103 TANGNTL BX SKIN EA SEP/ADDL: CPT

## 2019-06-05 ASSESSMENT — LOCATION DETAILED DESCRIPTION DERM
LOCATION DETAILED: LEFT SUPERIOR MEDIAL UPPER BACK
LOCATION DETAILED: RIGHT SUPERIOR UPPER BACK
LOCATION DETAILED: EPIGASTRIC SKIN
LOCATION DETAILED: RIGHT SUPERIOR MEDIAL MIDBACK
LOCATION DETAILED: LEFT SUPERIOR LATERAL UPPER BACK

## 2019-06-05 ASSESSMENT — LOCATION SIMPLE DESCRIPTION DERM
LOCATION SIMPLE: ABDOMEN
LOCATION SIMPLE: RIGHT UPPER BACK
LOCATION SIMPLE: RIGHT LOWER BACK
LOCATION SIMPLE: LEFT UPPER BACK

## 2019-06-05 ASSESSMENT — LOCATION ZONE DERM: LOCATION ZONE: TRUNK

## 2019-06-05 NOTE — PROCEDURE: BIOPSY BY SHAVE METHOD
Destruction After The Procedure: No
Lab: [id number]
Cryotherapy Text: The wound bed was treated with cryotherapy after the biopsy was performed.
Consent: Written consent was obtained and risks were reviewed including but not limited to scarring, infection, bleeding, scabbing, incomplete removal, nerve damage and allergy to anesthesia.
Anesthesia Type: 1% lidocaine with 1:100,000 epinephrine and a 1:3 solution of 8.4% sodium bicarbonate
Size Of Lesion In Cm: 0
Wound Care: Vaseline
Lab Facility: 
Electrodesiccation Text: The wound bed was treated with electrodesiccation after the biopsy was performed.
Render Post-Care Instructions In Note?: yes
Depth Of Biopsy: dermis
Biopsy Type: H and E
Type Of Destruction Used: Curettage
Electrodesiccation And Curettage Text: The wound bed was treated with electrodesiccation and curettage after the biopsy was performed.
Billing Type: Third-Party Bill
Anesthesia Volume In Cc: 1
Dressing: bandage
Silver Nitrate Text: The wound bed was treated with silver nitrate after the biopsy was performed.
Post-Care Instructions: I reviewed with the patient in detail post-care instructions. Patient is to keep the biopsy site dry overnight, and then apply bacitracin twice daily until healed. Patient may apply hydrogen peroxide soaks to remove any crusting.
Biopsy Method: Personna blade
Hemostasis: Drysol
Detail Level: Detailed
Curettage Text: The wound bed was treated with curettage after the biopsy was performed.
Notification Instructions: Patient will be notified of biopsy results. However, patient instructed to call the office if not contacted within 2 weeks.

## 2019-06-18 DIAGNOSIS — K22.719 BARRETT'S ESOPHAGUS WITH DYSPLASIA: ICD-10-CM

## 2019-06-18 DIAGNOSIS — Z98.890 S/P BALLOON DILATATION OF ESOPHAGEAL STRICTURE: ICD-10-CM

## 2019-06-19 RX ORDER — PANTOPRAZOLE SODIUM 40 MG/1
TABLET, DELAYED RELEASE ORAL
Qty: 90 TAB | Refills: 1 | Status: SHIPPED | OUTPATIENT
Start: 2019-06-19 | End: 2019-12-31

## 2019-06-24 ENCOUNTER — HOSPITAL ENCOUNTER (OUTPATIENT)
Dept: LAB | Facility: MEDICAL CENTER | Age: 59
End: 2019-06-24
Attending: FAMILY MEDICINE
Payer: COMMERCIAL

## 2019-06-24 ENCOUNTER — HOSPITAL ENCOUNTER (OUTPATIENT)
Dept: LAB | Facility: MEDICAL CENTER | Age: 59
End: 2019-06-24
Payer: COMMERCIAL

## 2019-06-24 DIAGNOSIS — E78.2 MIXED HYPERLIPIDEMIA: ICD-10-CM

## 2019-06-24 LAB
ALBUMIN SERPL BCP-MCNC: 4.1 G/DL (ref 3.2–4.9)
ALBUMIN/GLOB SERPL: 1.8 G/DL
ALP SERPL-CCNC: 99 U/L (ref 30–99)
ALT SERPL-CCNC: 19 U/L (ref 2–50)
ANION GAP SERPL CALC-SCNC: 5 MMOL/L (ref 0–11.9)
AST SERPL-CCNC: 24 U/L (ref 12–45)
BDY FAT % MEASURED: 35.2 %
BILIRUB SERPL-MCNC: 0.4 MG/DL (ref 0.1–1.5)
BP DIAS: 79 MMHG
BP SYS: 125 MMHG
BUN SERPL-MCNC: 9 MG/DL (ref 8–22)
CALCIUM SERPL-MCNC: 9.3 MG/DL (ref 8.5–10.5)
CHLORIDE SERPL-SCNC: 109 MMOL/L (ref 96–112)
CHOLEST SERPL-MCNC: 186 MG/DL (ref 100–199)
CHOLEST SERPL-MCNC: 188 MG/DL (ref 100–199)
CO2 SERPL-SCNC: 26 MMOL/L (ref 20–33)
CREAT SERPL-MCNC: 1 MG/DL (ref 0.5–1.4)
DIABETES HTDIA: NO
EVENT NAME HTEVT: NORMAL
FASTING HTFAS: YES
FASTING STATUS PATIENT QL REPORTED: NORMAL
GLOBULIN SER CALC-MCNC: 2.3 G/DL (ref 1.9–3.5)
GLUCOSE SERPL-MCNC: 79 MG/DL (ref 65–99)
GLUCOSE SERPL-MCNC: 79 MG/DL (ref 65–99)
HDLC SERPL-MCNC: 68 MG/DL
HDLC SERPL-MCNC: 68 MG/DL
HYPERTENSION HTHYP: NO
LDLC SERPL CALC-MCNC: 94 MG/DL
LDLC SERPL CALC-MCNC: 96 MG/DL
POTASSIUM SERPL-SCNC: 4.2 MMOL/L (ref 3.6–5.5)
PROT SERPL-MCNC: 6.4 G/DL (ref 6–8.2)
SCREENING LOC CITY HTCIT: NORMAL
SCREENING LOC STATE HTSTA: NORMAL
SCREENING LOCATION HTLOC: NORMAL
SMOKING HTSMO: NO
SODIUM SERPL-SCNC: 140 MMOL/L (ref 135–145)
SUBSCRIBER ID HTSID: NORMAL
TRIGL SERPL-MCNC: 118 MG/DL (ref 0–149)
TRIGL SERPL-MCNC: 121 MG/DL (ref 0–149)

## 2019-06-24 PROCEDURE — 80061 LIPID PANEL: CPT | Mod: 91

## 2019-06-24 PROCEDURE — 80053 COMPREHEN METABOLIC PANEL: CPT

## 2019-06-24 PROCEDURE — 82947 ASSAY GLUCOSE BLOOD QUANT: CPT

## 2019-06-24 PROCEDURE — S5190 WELLNESS ASSESSMENT BY NONPH: HCPCS

## 2019-06-24 PROCEDURE — 80061 LIPID PANEL: CPT

## 2019-06-24 PROCEDURE — 36415 COLL VENOUS BLD VENIPUNCTURE: CPT

## 2019-09-15 DIAGNOSIS — E78.2 MIXED HYPERLIPIDEMIA: ICD-10-CM

## 2019-09-16 RX ORDER — PRAVASTATIN SODIUM 20 MG
TABLET ORAL
Qty: 90 TAB | Refills: 0 | Status: SHIPPED | OUTPATIENT
Start: 2019-09-16 | End: 2019-12-16

## 2019-09-16 NOTE — TELEPHONE ENCOUNTER
Requested Prescriptions     Signed Prescriptions Disp Refills   • pravastatin (PRAVACHOL) 20 MG Tab 90 Tab 0     Sig: TAKE ONE TABLET BY MOUTH EVERY NIGHT AT BEDTIME     Authorizing Provider: OMAR CRUMP     Needs appointment  NAVEEN Leroy

## 2019-09-16 NOTE — TELEPHONE ENCOUNTER
Was the patient seen in the last year in this department? No  last seen 8/14/2018    Does patient have an active prescription for medications requested? No     Received Request Via: Pharmacy

## 2019-09-24 ENCOUNTER — IMMUNIZATION (OUTPATIENT)
Dept: OCCUPATIONAL MEDICINE | Facility: CLINIC | Age: 59
End: 2019-09-24

## 2019-09-24 DIAGNOSIS — Z23 NEED FOR VACCINATION: ICD-10-CM

## 2019-09-24 PROCEDURE — 90686 IIV4 VACC NO PRSV 0.5 ML IM: CPT | Performed by: PREVENTIVE MEDICINE

## 2019-11-20 ENCOUNTER — HOSPITAL ENCOUNTER (OUTPATIENT)
Dept: RADIOLOGY | Facility: MEDICAL CENTER | Age: 59
End: 2019-11-20
Attending: FAMILY MEDICINE
Payer: COMMERCIAL

## 2019-11-20 ENCOUNTER — OFFICE VISIT (OUTPATIENT)
Dept: MEDICAL GROUP | Facility: PHYSICIAN GROUP | Age: 59
End: 2019-11-20
Payer: COMMERCIAL

## 2019-11-20 VITALS
HEIGHT: 67 IN | SYSTOLIC BLOOD PRESSURE: 98 MMHG | TEMPERATURE: 98.9 F | DIASTOLIC BLOOD PRESSURE: 74 MMHG | BODY MASS INDEX: 24.43 KG/M2 | OXYGEN SATURATION: 98 % | HEART RATE: 82 BPM

## 2019-11-20 DIAGNOSIS — M53.3 COCCYX PAIN: ICD-10-CM

## 2019-11-20 DIAGNOSIS — M25.561 ACUTE PAIN OF RIGHT KNEE: ICD-10-CM

## 2019-11-20 PROCEDURE — 72220 X-RAY EXAM SACRUM TAILBONE: CPT

## 2019-11-20 PROCEDURE — 99212 OFFICE O/P EST SF 10 MIN: CPT | Performed by: FAMILY MEDICINE

## 2019-11-20 RX ORDER — ETODOLAC 400 MG/1
400 TABLET, FILM COATED ORAL 2 TIMES DAILY
Qty: 30 TAB | Refills: 3 | Status: SHIPPED
Start: 2019-11-20 | End: 2020-01-04

## 2019-11-20 ASSESSMENT — PATIENT HEALTH QUESTIONNAIRE - PHQ9: CLINICAL INTERPRETATION OF PHQ2 SCORE: 0

## 2019-11-21 NOTE — PROGRESS NOTES
"Patient comes in complaining of pain over her coccyx.  In the 1980s she had part of her tailbone removed because it was \"too long\".  This caused pain when she tried to sit up and after having that extra portion of her tailbone removed things got better.  Now she is complaining of pain over the tailbone especially when she tries to sit up.  I am wondering if she has scar tissue.  She denies any recent trauma to her tailbone.  We are going to get x-rays.  The patient had problems with pain in her right knee and I put her on etodolac in the past and worked well for her.  I am going to have her go back on etodolac.  The patient says her bowels and bladder functions are stable.    I reviewed the following    Past Medical History:   Diagnosis Date   • Arthritis     Osteo; both wrists   • Cataract    • Dyspnea    • Heart burn    • High cholesterol    • Indigestion    • Palpitations    • Pneumonia 1989        Past Surgical History:   Procedure Laterality Date   • CATARACT PHACO WITH IOL Right 7/10/2018    Procedure: CATARACT PHACO WITH IOL;  Surgeon: Martell Rai M.D.;  Location: SURGERY SAME DAY [facility];  Service: Ophthalmology   • CATARACT PHACO WITH IOL Left 6/26/2018    Procedure: CATARACT PHACO WITH IOL;  Surgeon: Martell Rai M.D.;  Location: SURGERY SAME DAY [facility];  Service: Ophthalmology   • NEUROMA EXCISION  10/18/2013    Performed by FELIPE DoanP.SARAH at SURGERY SAME DAY [facility] ORS   • NEUROMA MORTONS EXCISION  1990    Left foot   • LAMINOTOMY  1985    coccyxectomy   • LAPAROSCOPY  1982   • TONSILLECTOMY AND ADENOIDECTOMY  1972       Allergies   Allergen Reactions   • Fosamax      Esophageal Pain       Current Outpatient Medications   Medication Sig Dispense Refill   • etodolac (LODINE) 400 MG tablet Take 1 Tab by mouth 2 times a day. Take with food 30 Tab 3   • pravastatin (PRAVACHOL) 20 MG Tab TAKE ONE TABLET BY MOUTH EVERY NIGHT AT BEDTIME 90 Tab 0   • pantoprazole (PROTONIX) 40 " MG Tablet Delayed Response TAKE ONE TABLET BY MOUTH DAILY 90 Tab 1   • Protein POWD Take 2 Tbsp by mouth every 48 hours. Patient uses Isopure Powder in milk with banana and this helps hot flashes      • Calcium Carbonate-Vit D-Min (CALTRATE 600+D PLUS) 600-400 MG-UNIT TABS Take 2 Tabs by mouth every day.       No current facility-administered medications for this visit.         Family History   Problem Relation Age of Onset   • Hypertension Father        Social History     Socioeconomic History   • Marital status:      Spouse name: Not on file   • Number of children: Not on file   • Years of education: Not on file   • Highest education level: Not on file   Occupational History   • Not on file   Social Needs   • Financial resource strain: Not on file   • Food insecurity:     Worry: Not on file     Inability: Not on file   • Transportation needs:     Medical: Not on file     Non-medical: Not on file   Tobacco Use   • Smoking status: Former Smoker     Packs/day: 2.00     Years: 25.00     Pack years: 50.00     Types: Cigarettes     Last attempt to quit: 1/1/2009     Years since quitting: 10.8   • Smokeless tobacco: Never Used   Substance and Sexual Activity   • Alcohol use: No     Alcohol/week: 0.0 oz   • Drug use: No   • Sexual activity: Yes     Partners: Male     Birth control/protection: Post-Menopausal   Lifestyle   • Physical activity:     Days per week: Not on file     Minutes per session: Not on file   • Stress: Not on file   Relationships   • Social connections:     Talks on phone: Not on file     Gets together: Not on file     Attends [employer] service: Not on file     Active member of club or organization: Not on file     Attends meetings of clubs or organizations: Not on file     Relationship status: Not on file   • Intimate partner violence:     Fear of current or ex partner: Not on file     Emotionally abused: Not on file     Physically abused: Not on file     Forced sexual activity: Not on file    Other Topics Concern   • Not on file   Social History Narrative   • Not on file      The patient is well-developed well-nourished and in no acute distress    Pupils equally round and reactive to light    Ears normal    Nose normal    Throat clear    Neck supple no cervical adenopathy no thyromegaly    Chest clear to auscultation    Heart regular rhythm no murmur S3 or S4 appreciated    Back 1+ tender over the sacral area.  Nontender over the SI joints.  Straight leg raising is negative bilaterally.    Abdomen flat soft good bowel sounds no mass No hepatosplenomegaly no rebound    Skin no rashes or suspicious lesions    1. Acute pain of right knee   resolved   2. Coccyx pain  DX-SACRUM AND COCCYX 2+    etodolac (LODINE) 400 MG tablet--1 p.o. twice daily with food   Recheck PRN    Please note that this dictation was created using voice recognition software. I have worked with consultants from the vendor as well as technical experts from [facility] to optimize the interface. I have made every reasonable attempt to correct obvious errors, but I expect that there are errors of grammar and possibly content that I did not discover before finalizing the note.

## 2019-12-16 DIAGNOSIS — H53.9 CHANGE IN VISION: ICD-10-CM

## 2019-12-16 DIAGNOSIS — E78.2 MIXED HYPERLIPIDEMIA: ICD-10-CM

## 2019-12-16 RX ORDER — PRAVASTATIN SODIUM 20 MG
TABLET ORAL
Qty: 90 TAB | Refills: 3 | Status: SHIPPED | OUTPATIENT
Start: 2019-12-16 | End: 2020-04-06

## 2019-12-16 NOTE — TELEPHONE ENCOUNTER
Was the patient seen in the last year in this department? Yes lov 11/20/19    Does patient have an active prescription for medications requested? No     Received Request Via: Pharmacy

## 2019-12-31 DIAGNOSIS — K22.719 BARRETT'S ESOPHAGUS WITH DYSPLASIA: ICD-10-CM

## 2019-12-31 DIAGNOSIS — Z98.890 S/P BALLOON DILATATION OF ESOPHAGEAL STRICTURE: ICD-10-CM

## 2019-12-31 RX ORDER — PANTOPRAZOLE SODIUM 40 MG/1
TABLET, DELAYED RELEASE ORAL
Qty: 90 TAB | Refills: 3 | Status: SHIPPED | OUTPATIENT
Start: 2019-12-31 | End: 2021-01-04 | Stop reason: SDUPTHER

## 2020-01-04 ENCOUNTER — OFFICE VISIT (OUTPATIENT)
Dept: URGENT CARE | Facility: PHYSICIAN GROUP | Age: 60
End: 2020-01-04
Payer: COMMERCIAL

## 2020-01-04 VITALS
HEART RATE: 77 BPM | DIASTOLIC BLOOD PRESSURE: 82 MMHG | WEIGHT: 160 LBS | BODY MASS INDEX: 25.11 KG/M2 | HEIGHT: 67 IN | SYSTOLIC BLOOD PRESSURE: 116 MMHG | TEMPERATURE: 98.5 F | RESPIRATION RATE: 13 BRPM | OXYGEN SATURATION: 100 %

## 2020-01-04 DIAGNOSIS — J01.90 ACUTE NON-RECURRENT SINUSITIS, UNSPECIFIED LOCATION: ICD-10-CM

## 2020-01-04 PROCEDURE — 99214 OFFICE O/P EST MOD 30 MIN: CPT | Performed by: FAMILY MEDICINE

## 2020-01-04 RX ORDER — AMOXICILLIN AND CLAVULANATE POTASSIUM 875; 125 MG/1; MG/1
1 TABLET, FILM COATED ORAL 2 TIMES DAILY
Qty: 10 TAB | Refills: 0 | Status: SHIPPED | OUTPATIENT
Start: 2020-01-04 | End: 2020-01-09

## 2020-01-04 ASSESSMENT — ENCOUNTER SYMPTOMS
SORE THROAT: 1
SWOLLEN GLANDS: 0
COUGH: 1
SINUS PRESSURE: 1

## 2020-01-04 NOTE — PATIENT INSTRUCTIONS
Rest your voice, inhaled steam can help.  Avoid whispering.  Avoid over-the-counter decongestant.  This instructions for your hoarseness.    Oral antibiotic as directed twice daily for 5 days  Nasal saline irrigation also known as Boykins pot recommended  Follow up if not significantly improved as expected in 7 days, sooner if any worsening or new symptoms   Pharmacy request for refill of lantus.  Patient last seen on 11/23/18 by Carline TABOR  Last A1C on 11/23/18 of 7.9  FU scheduled with Dr. Tan on 7/12/19  Ok to refill per protocol.

## 2020-01-04 NOTE — PROGRESS NOTES
"Subjective:      Mei Sullivan is a 59 y.o. female who presents with Sinus Problem (pressure and pain x 1 week)            Sinus Problem   This is a new problem. The current episode started in the past 7 days. The problem has been gradually worsening since onset. Maximum temperature: Feeling feverish. The pain is moderate. Associated symptoms include congestion, coughing, sinus pressure (And pain) and a sore throat. Pertinent negatives include no ear pain or swollen glands. Past treatments include nothing. The treatment provided no relief.   She denies any history of frequent sinus infection or sinus surgeries.  He does have some hoarseness to.    Review of Systems   HENT: Positive for congestion, sinus pressure (And pain) and sore throat. Negative for ear pain.    Respiratory: Positive for cough.    All other systems reviewed and are negative.         Objective:     /82   Pulse 77   Temp 36.9 °C (98.5 °F)   Resp 13   Ht 1.702 m (5' 7\")   Wt 72.6 kg (160 lb)   LMP 08/01/2006   SpO2 100%   BMI 25.06 kg/m²      Physical Exam  Constitutional:       General: She is not in acute distress.     Appearance: She is not ill-appearing, toxic-appearing or diaphoretic.   HENT:      Head: Normocephalic and atraumatic.      Right Ear: Tympanic membrane, ear canal and external ear normal.      Left Ear: Tympanic membrane, ear canal and external ear normal.      Nose: Congestion present.      Right Sinus: Maxillary sinus tenderness present. No frontal sinus tenderness.      Left Sinus: Maxillary sinus tenderness present. No frontal sinus tenderness.      Mouth/Throat:      Mouth: Mucous membranes are moist. No oral lesions.      Pharynx: Oropharynx is clear. Uvula midline. No pharyngeal swelling, oropharyngeal exudate, posterior oropharyngeal erythema or uvula swelling.      Tonsils: No tonsillar exudate or tonsillar abscesses.   Eyes:      General: No scleral icterus.     Conjunctiva/sclera: Conjunctivae " normal.   Neck:      Musculoskeletal: Neck supple. No muscular tenderness.   Cardiovascular:      Rate and Rhythm: Normal rate and regular rhythm.      Heart sounds: No murmur. No friction rub. No gallop.    Pulmonary:      Effort: Pulmonary effort is normal. No respiratory distress.      Breath sounds: No stridor. No wheezing, rhonchi or rales.   Lymphadenopathy:      Cervical: No cervical adenopathy.   Skin:     General: Skin is warm.      Coloration: Skin is not jaundiced or pale.      Findings: No bruising, erythema, lesion or rash.   Neurological:      Mental Status: She is alert and oriented to person, place, and time.   Psychiatric:         Mood and Affect: Mood normal.               Assessment/Plan:       1. Acute non-recurrent sinusitis, unspecified location  - amoxicillin-clavulanate (AUGMENTIN) 875-125 MG Tab; Take 1 Tab by mouth 2 times a day for 5 days.  Dispense: 10 Tab; Refill: 0      Continue symptomatic care  Nasal saline irrigation recommended  Plan per orders and instructions  Warning signs reviewed

## 2020-02-18 ENCOUNTER — APPOINTMENT (OUTPATIENT)
Dept: RADIOLOGY | Facility: MEDICAL CENTER | Age: 60
End: 2020-02-18
Attending: FAMILY MEDICINE
Payer: COMMERCIAL

## 2020-02-18 DIAGNOSIS — Z12.31 VISIT FOR SCREENING MAMMOGRAM: ICD-10-CM

## 2020-03-04 ENCOUNTER — TELEPHONE (OUTPATIENT)
Dept: HEALTH INFORMATION MANAGEMENT | Facility: OTHER | Age: 60
End: 2020-03-04

## 2020-03-04 NOTE — TELEPHONE ENCOUNTER
Left vm     If pt calls please transfer to [phone]    Attempt #1  
PAST SURGICAL HISTORY:  H/O cervical spine surgery     History of excision of pilonidal cyst ' 87    History of hydrocelectomy     S/P right knee arthroscopy 5/2017    S/P rotator cuff repair ' 95 and '96    Left; right 2017    Status post colonoscopy ' 2015   negative    Status post laminectomy with spinal fusion ' 1991:  Cervical

## 2020-04-06 DIAGNOSIS — E78.2 MIXED HYPERLIPIDEMIA: ICD-10-CM

## 2020-04-06 RX ORDER — ATORVASTATIN CALCIUM 10 MG/1
10 TABLET, FILM COATED ORAL
Qty: 90 TAB | Refills: 3 | Status: SHIPPED | OUTPATIENT
Start: 2020-04-06 | End: 2021-06-04 | Stop reason: SDUPTHER

## 2020-05-27 ENCOUNTER — PATIENT OUTREACH (OUTPATIENT)
Dept: SCHEDULING | Facility: IMAGING CENTER | Age: 60
End: 2020-05-27

## 2020-07-15 ENCOUNTER — OFFICE VISIT (OUTPATIENT)
Dept: MEDICAL GROUP | Facility: PHYSICIAN GROUP | Age: 60
End: 2020-07-15
Payer: COMMERCIAL

## 2020-07-15 VITALS
HEIGHT: 67 IN | TEMPERATURE: 98.2 F | HEART RATE: 82 BPM | SYSTOLIC BLOOD PRESSURE: 152 MMHG | RESPIRATION RATE: 16 BRPM | BODY MASS INDEX: 25.9 KG/M2 | OXYGEN SATURATION: 98 % | DIASTOLIC BLOOD PRESSURE: 88 MMHG | WEIGHT: 165 LBS

## 2020-07-15 DIAGNOSIS — M15.9 PRIMARY OSTEOARTHRITIS INVOLVING MULTIPLE JOINTS: ICD-10-CM

## 2020-07-15 DIAGNOSIS — Z12.39 SCREENING FOR BREAST CANCER: ICD-10-CM

## 2020-07-15 DIAGNOSIS — Z87.891 FORMER SMOKER: ICD-10-CM

## 2020-07-15 DIAGNOSIS — F50.2 BULIMIA NERVOSA: ICD-10-CM

## 2020-07-15 DIAGNOSIS — M53.3 COCCYX PAIN: ICD-10-CM

## 2020-07-15 DIAGNOSIS — R76.8 HEPATITIS B ANTIBODY POSITIVE: ICD-10-CM

## 2020-07-15 DIAGNOSIS — K22.719 BARRETT'S ESOPHAGUS WITH DYSPLASIA: ICD-10-CM

## 2020-07-15 DIAGNOSIS — Z11.59 ENCOUNTER FOR HEPATITIS C SCREENING TEST FOR LOW RISK PATIENT: ICD-10-CM

## 2020-07-15 PROBLEM — A04.8 H. PYLORI INFECTION: Status: RESOLVED | Noted: 2017-05-01 | Resolved: 2020-07-15

## 2020-07-15 PROCEDURE — 99214 OFFICE O/P EST MOD 30 MIN: CPT | Performed by: NURSE PRACTITIONER

## 2020-07-15 RX ORDER — ETODOLAC 400 MG/1
400 TABLET, FILM COATED ORAL 2 TIMES DAILY
COMMUNITY
End: 2020-11-17 | Stop reason: SDUPTHER

## 2020-07-15 SDOH — HEALTH STABILITY: MENTAL HEALTH: HOW OFTEN DO YOU HAVE A DRINK CONTAINING ALCOHOL?: 2-3 TIMES A WEEK

## 2020-07-15 ASSESSMENT — PATIENT HEALTH QUESTIONNAIRE - PHQ9: CLINICAL INTERPRETATION OF PHQ2 SCORE: 0

## 2020-07-15 NOTE — ASSESSMENT & PLAN NOTE
Sees Dr. mcbride every three years for this.  protonix daily.  Hx of barretts and h pylori and that has treated.  No pain or difficulty swallowing.

## 2020-07-15 NOTE — PROGRESS NOTES
"Chief Complaint   Patient presents with   • Establish Care       Subjective:   Mei Sullivan is a 60 y.o. female here today to establish care.  for evaluation and management of:    Osteoarthritis  lodine nightly for this.  Wrist/sacrum.     Coccyx pain  Has had surgery for removal of extra bone.     Cox's esophagus  Sees Dr. mcbride every three years for this.  protonix daily.  Hx of barretts and h pylori and that has treated.  No pain or difficulty swallowing.      Osteopenia  Taking viactive and vitamin D    Former smoker  Has had two CT scans that were negative.  No cough, sputum, fever, chills.     Hepatitis B antibody positive  Testing was ne         Current medicines (including changes today)  Current Outpatient Medications   Medication Sig Dispense Refill   • etodolac (LODINE) 400 MG tablet Take 400 mg by mouth 2 times a day.     • atorvastatin (LIPITOR) 10 MG Tab Take 1 Tab by mouth every bedtime. 90 Tab 3   • pantoprazole (PROTONIX) 40 MG Tablet Delayed Response TAKE ONE TABLET BY MOUTH DAILY 90 Tab 3   • Protein POWD Take 2 Tbsp by mouth every 48 hours. Patient uses Isopure Powder in milk with banana and this helps hot flashes      • Calcium Carbonate-Vit D-Min (CALTRATE 600+D PLUS) 600-400 MG-UNIT TABS Take 2 Tabs by mouth every day.       No current facility-administered medications for this visit.      She  has a past medical history of Arthritis, Cataract, Dyspnea, GERD (gastroesophageal reflux disease), Heart burn, High cholesterol, Indigestion, Palpitations, and Pneumonia (1989).    ROS as stated in hpi  No chest pain, no shortness of breath, no abdominal pain       Objective:     /88 (BP Location: Left arm, Patient Position: Sitting)   Pulse 82   Temp 36.8 °C (98.2 °F) (Temporal)   Resp 16   Ht 1.702 m (5' 7\")   Wt 74.8 kg (165 lb)   SpO2 98%  Body mass index is 25.84 kg/m². slightly elevated.    Physical Exam:  Constitutional: Alert, no distress.  Skin: Warm, dry, good " turgor,no cyanosis, no rashes in visible areas.  Eye: Equal, round and reactive, conjunctiva clear, lids normal.  Ears: No tenderness, no discharge.  External canals are without any significant edema or erythema.   Gross auditory acuity is intact.  Nose: symmetrical without tenderness, no discharge.  Mouth/Throat: lips without lesion.  Oropharynx clear.    Neck: Trachea midline, no masses, no obvious thyroid enlargement.. No cervical or supraclavicular lymphadenopathy. Range of motion within normal limits.  Neuro: Cranial nerves 2-12 grossly intact.  No sensory deficit.  Respiratory: Unlabored respiratory effort, lungs clear to auscultation, no wheezes, no ronchi.  Cardiovascular: Normal S1, S2, no murmur, no edema..  Psych: Alert and oriented x3, normal affect and mood and judgement.        Assessment and Plan:   The following treatment plan was discussed    1. Bulimia   new problemChronic, not currently active  - CBC WITH DIFFERENTIAL; Future  - Comp Metabolic Panel; Future  - Lipid Profile; Future  - TSH; Future  - FREE THYROXINE; Future  - VITAMIN D,25 HYDROXY; Future    2. Primary osteoarthritis involving multiple joints  New problem. Chronic, ongoing. Helps with lodine.  Due for kidney check.    3. Coccyx pain  New problemChronic, ongoing. Stable.     4. Cox's esophagus with dysplasia  New problem.  Chronic, followed by Dr. Hinton.  Due next year for EGD, will also add her 10 year colonoscopy at that time.  Monitor.         5. Former smoker  New problem.  Has quit for 11+ years.  Doing well.     6. Hepatitis B antibody positive  New problem.  Found on blood donation.  Labs per last PCP were negative.  Normal liver function testing.     7. Encounter for hepatitis C screening test for low risk patient  Due for screening.  Monitor and follow.   - HEP C VIRUS ANTIBODY; Future    8. Screening for breast cancer  Due for mammogram.  Monitor and follow.   - MA-SCREENING MAMMO BILAT W/CAD; Future      Followup:  Return if symptoms worsen or fail to improve, for well woman exam.         Educated in proper administration of medication(s) ordered today including safety, possible SE, risks, benefits, rationale and alternatives to therapy.     Please note that this dictation was created using voice recognition software. I have made every reasonable attempt to correct obvious errors, but I expect that there are errors of grammar and possibly content that I did not discover before finalizing the note.

## 2020-07-24 ENCOUNTER — TELEPHONE (OUTPATIENT)
Dept: SCHEDULING | Facility: IMAGING CENTER | Age: 60
End: 2020-07-24

## 2020-07-24 NOTE — TELEPHONE ENCOUNTER
PT does not wish to have Lung cancer screening this year, but would like to stay on the list for next.     Attempt #1

## 2020-08-06 ENCOUNTER — APPOINTMENT (OUTPATIENT)
Dept: RADIOLOGY | Facility: MEDICAL CENTER | Age: 60
End: 2020-08-06
Attending: NURSE PRACTITIONER
Payer: COMMERCIAL

## 2020-08-25 ENCOUNTER — HOSPITAL ENCOUNTER (OUTPATIENT)
Dept: RADIOLOGY | Facility: MEDICAL CENTER | Age: 60
End: 2020-08-25
Attending: NURSE PRACTITIONER
Payer: COMMERCIAL

## 2020-08-25 DIAGNOSIS — Z12.39 SCREENING FOR BREAST CANCER: ICD-10-CM

## 2020-08-25 PROCEDURE — 77067 SCR MAMMO BI INCL CAD: CPT

## 2020-09-22 ENCOUNTER — IMMUNIZATION (OUTPATIENT)
Dept: OCCUPATIONAL MEDICINE | Facility: CLINIC | Age: 60
End: 2020-09-22

## 2020-09-22 DIAGNOSIS — Z23 NEED FOR VACCINATION: ICD-10-CM

## 2020-09-22 PROCEDURE — 90686 IIV4 VACC NO PRSV 0.5 ML IM: CPT | Performed by: PREVENTIVE MEDICINE

## 2020-09-26 ENCOUNTER — HOSPITAL ENCOUNTER (OUTPATIENT)
Dept: LAB | Facility: MEDICAL CENTER | Age: 60
End: 2020-09-26
Attending: NURSE PRACTITIONER
Payer: COMMERCIAL

## 2020-09-26 DIAGNOSIS — Z11.59 ENCOUNTER FOR HEPATITIS C SCREENING TEST FOR LOW RISK PATIENT: ICD-10-CM

## 2020-09-26 DIAGNOSIS — F50.2 BULIMIA NERVOSA: ICD-10-CM

## 2020-09-26 LAB
25(OH)D3 SERPL-MCNC: 37 NG/ML (ref 30–100)
ALBUMIN SERPL BCP-MCNC: 4.3 G/DL (ref 3.2–4.9)
ALBUMIN/GLOB SERPL: 2 G/DL
ALP SERPL-CCNC: 98 U/L (ref 30–99)
ALT SERPL-CCNC: 30 U/L (ref 2–50)
ANION GAP SERPL CALC-SCNC: 12 MMOL/L (ref 7–16)
AST SERPL-CCNC: 27 U/L (ref 12–45)
BASOPHILS # BLD AUTO: 0.8 % (ref 0–1.8)
BASOPHILS # BLD: 0.04 K/UL (ref 0–0.12)
BILIRUB SERPL-MCNC: 0.5 MG/DL (ref 0.1–1.5)
BUN SERPL-MCNC: 17 MG/DL (ref 8–22)
CALCIUM SERPL-MCNC: 9.2 MG/DL (ref 8.5–10.5)
CHLORIDE SERPL-SCNC: 101 MMOL/L (ref 96–112)
CHOLEST SERPL-MCNC: 168 MG/DL (ref 100–199)
CO2 SERPL-SCNC: 26 MMOL/L (ref 20–33)
CREAT SERPL-MCNC: 0.8 MG/DL (ref 0.5–1.4)
EOSINOPHIL # BLD AUTO: 0.22 K/UL (ref 0–0.51)
EOSINOPHIL NFR BLD: 4.5 % (ref 0–6.9)
ERYTHROCYTE [DISTWIDTH] IN BLOOD BY AUTOMATED COUNT: 43.5 FL (ref 35.9–50)
FASTING STATUS PATIENT QL REPORTED: NORMAL
GLOBULIN SER CALC-MCNC: 2.2 G/DL (ref 1.9–3.5)
GLUCOSE SERPL-MCNC: 85 MG/DL (ref 65–99)
HCT VFR BLD AUTO: 42.5 % (ref 37–47)
HCV AB SER QL: NORMAL
HDLC SERPL-MCNC: 73 MG/DL
HGB BLD-MCNC: 14.2 G/DL (ref 12–16)
IMM GRANULOCYTES # BLD AUTO: 0.01 K/UL (ref 0–0.11)
IMM GRANULOCYTES NFR BLD AUTO: 0.2 % (ref 0–0.9)
LDLC SERPL CALC-MCNC: 80 MG/DL
LYMPHOCYTES # BLD AUTO: 1.23 K/UL (ref 1–4.8)
LYMPHOCYTES NFR BLD: 25 % (ref 22–41)
MCH RBC QN AUTO: 31.8 PG (ref 27–33)
MCHC RBC AUTO-ENTMCNC: 33.4 G/DL (ref 33.6–35)
MCV RBC AUTO: 95.3 FL (ref 81.4–97.8)
MONOCYTES # BLD AUTO: 0.26 K/UL (ref 0–0.85)
MONOCYTES NFR BLD AUTO: 5.3 % (ref 0–13.4)
NEUTROPHILS # BLD AUTO: 3.16 K/UL (ref 2–7.15)
NEUTROPHILS NFR BLD: 64.2 % (ref 44–72)
NRBC # BLD AUTO: 0 K/UL
NRBC BLD-RTO: 0 /100 WBC
PLATELET # BLD AUTO: 286 K/UL (ref 164–446)
PMV BLD AUTO: 10.4 FL (ref 9–12.9)
POTASSIUM SERPL-SCNC: 4 MMOL/L (ref 3.6–5.5)
PROT SERPL-MCNC: 6.5 G/DL (ref 6–8.2)
RBC # BLD AUTO: 4.46 M/UL (ref 4.2–5.4)
SODIUM SERPL-SCNC: 139 MMOL/L (ref 135–145)
T4 FREE SERPL-MCNC: 1.32 NG/DL (ref 0.93–1.7)
TRIGL SERPL-MCNC: 77 MG/DL (ref 0–149)
TSH SERPL DL<=0.005 MIU/L-ACNC: 1.32 UIU/ML (ref 0.38–5.33)
WBC # BLD AUTO: 4.9 K/UL (ref 4.8–10.8)

## 2020-09-26 PROCEDURE — 85025 COMPLETE CBC W/AUTO DIFF WBC: CPT

## 2020-09-26 PROCEDURE — 84439 ASSAY OF FREE THYROXINE: CPT

## 2020-09-26 PROCEDURE — 80061 LIPID PANEL: CPT

## 2020-09-26 PROCEDURE — 36415 COLL VENOUS BLD VENIPUNCTURE: CPT

## 2020-09-26 PROCEDURE — 84443 ASSAY THYROID STIM HORMONE: CPT

## 2020-09-26 PROCEDURE — 82306 VITAMIN D 25 HYDROXY: CPT

## 2020-09-26 PROCEDURE — 86803 HEPATITIS C AB TEST: CPT

## 2020-09-26 PROCEDURE — 80053 COMPREHEN METABOLIC PANEL: CPT

## 2020-11-12 ENCOUNTER — PATIENT MESSAGE (OUTPATIENT)
Dept: MEDICAL GROUP | Facility: PHYSICIAN GROUP | Age: 60
End: 2020-11-12

## 2020-11-12 DIAGNOSIS — Z12.11 SCREEN FOR COLON CANCER: ICD-10-CM

## 2020-11-12 DIAGNOSIS — K22.719 BARRETT'S ESOPHAGUS WITH DYSPLASIA: ICD-10-CM

## 2020-11-17 RX ORDER — ETODOLAC 400 MG/1
400 TABLET, FILM COATED ORAL 2 TIMES DAILY WITH MEALS
Qty: 60 TAB | Refills: 6 | Status: SHIPPED | OUTPATIENT
Start: 2020-11-17 | End: 2021-09-27

## 2020-11-17 NOTE — TELEPHONE ENCOUNTER
Requested Prescriptions     Signed Prescriptions Disp Refills   • etodolac (LODINE) 400 MG tablet 60 Tab 6     Sig: Take 1 Tab by mouth 2 times a day, with meals.     Authorizing Provider: OMAR CRUMP A.P.R.N.

## 2020-12-20 DIAGNOSIS — Z23 NEED FOR VACCINATION: ICD-10-CM

## 2021-01-04 DIAGNOSIS — K22.719 BARRETT'S ESOPHAGUS WITH DYSPLASIA: ICD-10-CM

## 2021-01-04 DIAGNOSIS — Z98.890 S/P BALLOON DILATATION OF ESOPHAGEAL STRICTURE: ICD-10-CM

## 2021-01-04 RX ORDER — PANTOPRAZOLE SODIUM 40 MG/1
TABLET, DELAYED RELEASE ORAL
Qty: 90 TAB | Refills: 3 | Status: SHIPPED | OUTPATIENT
Start: 2021-01-04 | End: 2022-01-26 | Stop reason: SDUPTHER

## 2021-01-04 NOTE — TELEPHONE ENCOUNTER
Requested Prescriptions     Signed Prescriptions Disp Refills   • pantoprazole (PROTONIX) 40 MG Tablet Delayed Response 90 Tab 3     Sig: TAKE ONE TABLET BY MOUTH DAILY     Authorizing Provider: OMAR CRUMP A.P.R.N.

## 2021-01-04 NOTE — TELEPHONE ENCOUNTER
Received request via: Pharmacy    Was the patient seen in the last year in this department? Yes LOV 07/15/2020    Does the patient have an active prescription (recently filled or refills available) for medication(s) requested? No

## 2021-03-12 ENCOUNTER — IMMUNIZATION (OUTPATIENT)
Dept: FAMILY PLANNING/WOMEN'S HEALTH CLINIC | Facility: IMMUNIZATION CENTER | Age: 61
End: 2021-03-12
Attending: FAMILY MEDICINE
Payer: COMMERCIAL

## 2021-03-12 DIAGNOSIS — Z23 NEED FOR VACCINATION: ICD-10-CM

## 2021-03-12 DIAGNOSIS — Z23 ENCOUNTER FOR VACCINATION: Primary | ICD-10-CM

## 2021-03-12 PROCEDURE — 91301 MODERNA SARS-COV-2 VACCINE: CPT | Performed by: HOSPITALIST

## 2021-03-12 PROCEDURE — 0011A MODERNA SARS-COV-2 VACCINE: CPT | Performed by: HOSPITALIST

## 2021-04-10 ENCOUNTER — IMMUNIZATION (OUTPATIENT)
Dept: FAMILY PLANNING/WOMEN'S HEALTH CLINIC | Facility: IMMUNIZATION CENTER | Age: 61
End: 2021-04-10
Attending: INTERNAL MEDICINE
Payer: COMMERCIAL

## 2021-04-10 DIAGNOSIS — Z23 ENCOUNTER FOR VACCINATION: Primary | ICD-10-CM

## 2021-04-10 PROCEDURE — 91301 MODERNA SARS-COV-2 VACCINE: CPT

## 2021-04-10 PROCEDURE — 0012A MODERNA SARS-COV-2 VACCINE: CPT

## 2021-06-04 DIAGNOSIS — E78.2 MIXED HYPERLIPIDEMIA: ICD-10-CM

## 2021-06-04 RX ORDER — ATORVASTATIN CALCIUM 10 MG/1
10 TABLET, FILM COATED ORAL
Qty: 90 TABLET | Refills: 0 | Status: SHIPPED | OUTPATIENT
Start: 2021-06-04 | End: 2021-09-01

## 2021-06-05 NOTE — TELEPHONE ENCOUNTER
Requested Prescriptions     Signed Prescriptions Disp Refills   • atorvastatin (LIPITOR) 10 MG Tab 90 tablet 0     Sig: Take 1 tablet by mouth at bedtime.     Authorizing Provider: OMAR CRUMP A.P.R.N.

## 2021-07-15 ENCOUNTER — HOSPITAL ENCOUNTER (OUTPATIENT)
Facility: MEDICAL CENTER | Age: 61
End: 2021-07-15
Attending: NURSE PRACTITIONER
Payer: COMMERCIAL

## 2021-07-15 ENCOUNTER — OFFICE VISIT (OUTPATIENT)
Dept: MEDICAL GROUP | Facility: PHYSICIAN GROUP | Age: 61
End: 2021-07-15
Payer: COMMERCIAL

## 2021-07-15 VITALS
OXYGEN SATURATION: 98 % | WEIGHT: 168 LBS | TEMPERATURE: 97.3 F | SYSTOLIC BLOOD PRESSURE: 130 MMHG | RESPIRATION RATE: 16 BRPM | HEART RATE: 82 BPM | DIASTOLIC BLOOD PRESSURE: 76 MMHG | BODY MASS INDEX: 26.37 KG/M2 | HEIGHT: 67 IN

## 2021-07-15 DIAGNOSIS — Z12.31 ENCOUNTER FOR SCREENING MAMMOGRAM FOR MALIGNANT NEOPLASM OF BREAST: ICD-10-CM

## 2021-07-15 DIAGNOSIS — Z12.4 SCREENING FOR CERVICAL CANCER: ICD-10-CM

## 2021-07-15 DIAGNOSIS — Z01.419 WELL WOMAN EXAM WITH ROUTINE GYNECOLOGICAL EXAM: ICD-10-CM

## 2021-07-15 PROBLEM — M25.561 ACUTE PAIN OF RIGHT KNEE: Status: RESOLVED | Noted: 2019-11-20 | Resolved: 2021-07-15

## 2021-07-15 PROBLEM — N95.8 OTHER SPECIFIED MENOPAUSAL AND POSTMENOPAUSAL DISORDER: Status: RESOLVED | Noted: 2017-03-29 | Resolved: 2021-07-15

## 2021-07-15 PROCEDURE — 99396 PREV VISIT EST AGE 40-64: CPT | Performed by: NURSE PRACTITIONER

## 2021-07-15 PROCEDURE — 87624 HPV HI-RISK TYP POOLED RSLT: CPT

## 2021-07-15 PROCEDURE — 88175 CYTOPATH C/V AUTO FLUID REDO: CPT

## 2021-07-15 PROCEDURE — 99000 SPECIMEN HANDLING OFFICE-LAB: CPT | Performed by: NURSE PRACTITIONER

## 2021-07-15 RX ORDER — ESTRADIOL 0.1 MG/G
1 CREAM VAGINAL
Qty: 42.5 G | Refills: 3 | Status: SHIPPED | OUTPATIENT
Start: 2021-07-15

## 2021-07-15 ASSESSMENT — PATIENT HEALTH QUESTIONNAIRE - PHQ9: CLINICAL INTERPRETATION OF PHQ2 SCORE: 0

## 2021-07-15 ASSESSMENT — FIBROSIS 4 INDEX: FIB4 SCORE: 1.05

## 2021-07-15 NOTE — PROGRESS NOTES
CC:  Pap/Well Woman Exam    History of present illness:  Mei Sullivan is 61 y.o.white female presenting today for well woman exam with gynecological exam and Pap smear. Menopausal.  Reports pain with intercourse. Diet . Exercise most days    No problem-specific Assessment & Plan notes found for this encounter.      Past Medical History:   Diagnosis Date   • Arthritis     Osteo; both wrists   • Cataract    • Dyspnea    • GERD (gastroesophageal reflux disease)    • Heart burn    • High cholesterol    • Indigestion    • Palpitations    • Pneumonia 1989       Past Surgical History:   Procedure Laterality Date   • CATARACT PHACO WITH IOL Right 7/10/2018    Procedure: CATARACT PHACO WITH IOL;  Surgeon: Martell Rai M.D.;  Location: SURGERY SAME DAY [facility];  Service: Ophthalmology   • CATARACT PHACO WITH IOL Left 6/26/2018    Procedure: CATARACT PHACO WITH IOL;  Surgeon: Martell Rai M.D.;  Location: SURGERY SAME DAY [facility];  Service: Ophthalmology   • NEUROMA EXCISION  10/18/2013    Performed by Jesenia Baxter D.P.MJesusita at SURGERY SAME DAY [facility] ORS   • NEUROMA MORTONS EXCISION  1990    Left foot   • LAMINOTOMY  1985    coccyxectomy   • LAPAROSCOPY  1982   • TONSILLECTOMY AND ADENOIDECTOMY  1972   • OPEN REDUCTION      sacrum/coccyx       Outpatient Encounter Medications as of 7/15/2021   Medication Sig Dispense Refill   • atorvastatin (LIPITOR) 10 MG Tab Take 1 tablet by mouth at bedtime. 90 tablet 0   • pantoprazole (PROTONIX) 40 MG Tablet Delayed Response TAKE ONE TABLET BY MOUTH DAILY 90 Tab 3   • etodolac (LODINE) 400 MG tablet Take 1 Tab by mouth 2 times a day, with meals. 60 Tab 6   • Protein POWD Take 2 Tbsp by mouth every 48 hours. Patient uses Isopure Powder in milk with banana and this helps hot flashes      • Calcium Carbonate-Vit D-Min (CALTRATE 600+D PLUS) 600-400 MG-UNIT TABS Take 2 Tabs by mouth every day.       No facility-administered encounter medications on file as  of 7/15/2021.       Patient Active Problem List    Diagnosis Date Noted   • Well woman exam with routine gynecological exam 07/15/2021   • Former smoker 07/15/2020   • Coccyx pain 2019   • S/P balloon dilatation of esophageal stricture 2017   • Cox's esophagus 2015   • Bulimia 2012   • Constipation 2010   • Osteoarthritis 2010       .  Social History     Socioeconomic History   • Marital status:      Spouse name: Not on file   • Number of children: Not on file   • Years of education: Not on file   • Highest education level: Not on file   Occupational History   • Not on file   Tobacco Use   • Smoking status: Former Smoker     Packs/day: 2.00     Years: 25.00     Pack years: 50.00     Types: Cigarettes     Quit date: 2009     Years since quittin.5   • Smokeless tobacco: Never Used   Vaping Use   • Vaping Use: Never used   Substance and Sexual Activity   • Alcohol use: Yes     Alcohol/week: 0.6 oz     Types: 1 Glasses of wine per week   • Drug use: No   • Sexual activity: Yes     Partners: Male     Birth control/protection: Post-Menopausal   Other Topics Concern   • Not on file   Social History Narrative   • Not on file     Social Determinants of Health     Financial Resource Strain:    • Difficulty of Paying Living Expenses:    Food Insecurity:    • Worried About Running Out of Food in the Last Year:    • Ran Out of Food in the Last Year:    Transportation Needs:    • Lack of Transportation (Medical):    • Lack of Transportation (Non-Medical):    Physical Activity:    • Days of Exercise per Week:    • Minutes of Exercise per Session:    Stress:    • Feeling of Stress :    Social Connections:    • Frequency of Communication with Friends and Family:    • Frequency of Social Gatherings with Friends and Family:    • Attends [employer]ahaoism Services:    • Active Member of Clubs or Organizations:    • Attends Club or Organization Meetings:    • Marital Status:    Intimate  "Partner Violence:    • Fear of Current or Ex-Partner:    • Emotionally Abused:    • Physically Abused:    • Sexually Abused:        Family History   Problem Relation Age of Onset   • Hypertension Father          ROS:  Denies Weight loss, fatigue, chest pain, SOB, bowel or bladder changes. No significant dysmenorrhea, concerning vaginal discharge or irritation, + dyspareunia or postcoital bleeding. Denies h/o migraine with aura. Denies musculoskeletal, neurological, or psychiatric problems.      Resp 16   Ht 1.702 m (5' 7\")   Wt 76.2 kg (168 lb)   LMP 08/01/2006   BMI 26.31 kg/m²     GEN:  Appears well and in no apparent distress   NECK:  Supple without adenopathy or thyromegaly  LUNGS:  Clear and equal. No wheeze, ronchi, or rales.  CV:  RRR, S1, S2. No murmur.  Pedal pulses 2+ bilaterally.  BREAST:  Symmetrical without masses. No nipple discharge.  ABD:  Soft, non-tender, non-distended, normal bowel sounds.  No hepatosplenomegaly.  :  Normal external female genitalia. Pale vaginal walls  Vaginal canal clear.  Cervix appears normal. Specimen collected from transformation zone. Bimanual exam:  No CMT, normal size uterus without masses or tenderness; no adnexal masses or tenderness.      Assessment and plan    1. Well woman exam with routine gynecological exam  Here for pap.  Discussed pain with intercourse.  RX for estrogen vaginal cream sent with discussion regarding silicone lubricant.  Mammogram ordered. Declines lung cancer screening at this time.     2. Screening for cervical cancer  Pap obtained.  To pathology. Monitor and follow.   - THINPREP PAP WITH HPV; Future      F/u pending results    A chaperone was offered to the patient during today's exam. Patient declined chaperone  "

## 2021-07-16 DIAGNOSIS — Z12.4 SCREENING FOR CERVICAL CANCER: ICD-10-CM

## 2021-07-20 LAB
CYTOLOGY REG CYTOL: NORMAL
HPV HR 12 DNA CVX QL NAA+PROBE: NEGATIVE
HPV16 DNA SPEC QL NAA+PROBE: NEGATIVE
HPV18 DNA SPEC QL NAA+PROBE: NEGATIVE
SPECIMEN SOURCE: NORMAL

## 2021-09-01 DIAGNOSIS — E78.2 MIXED HYPERLIPIDEMIA: ICD-10-CM

## 2021-09-01 RX ORDER — ATORVASTATIN CALCIUM 10 MG/1
TABLET, FILM COATED ORAL
Qty: 90 TABLET | Refills: 0 | Status: SHIPPED | OUTPATIENT
Start: 2021-09-01 | End: 2021-12-27 | Stop reason: SDUPTHER

## 2021-09-01 NOTE — TELEPHONE ENCOUNTER
Requested Prescriptions     Signed Prescriptions Disp Refills   • atorvastatin (LIPITOR) 10 MG Tab 90 Tablet 0     Sig: TAKE ONE TABLET BY MOUTH AT BEDTIME     Authorizing Provider: OMAR CRUMP A.P.R.N.

## 2021-09-24 ENCOUNTER — HOSPITAL ENCOUNTER (OUTPATIENT)
Dept: RADIOLOGY | Facility: MEDICAL CENTER | Age: 61
End: 2021-09-24
Attending: NURSE PRACTITIONER
Payer: COMMERCIAL

## 2021-09-24 DIAGNOSIS — Z12.31 ENCOUNTER FOR SCREENING MAMMOGRAM FOR MALIGNANT NEOPLASM OF BREAST: ICD-10-CM

## 2021-09-24 PROCEDURE — 77063 BREAST TOMOSYNTHESIS BI: CPT

## 2021-09-27 RX ORDER — ETODOLAC 400 MG/1
TABLET, FILM COATED ORAL
Qty: 60 TABLET | Refills: 2 | Status: SHIPPED | OUTPATIENT
Start: 2021-09-27 | End: 2022-01-26 | Stop reason: SDUPTHER

## 2021-09-27 NOTE — TELEPHONE ENCOUNTER
Requested Prescriptions     Signed Prescriptions Disp Refills   • etodolac (LODINE) 400 MG tablet 60 Tablet 2     Sig: TAKE ONE TABLET BY MOUTH TWICE A DAY WITH MEALS     Authorizing Provider: OMAR CRUMP A.P.R.N.

## 2021-10-06 ENCOUNTER — PATIENT MESSAGE (OUTPATIENT)
Dept: HEALTH INFORMATION MANAGEMENT | Facility: OTHER | Age: 61
End: 2021-10-06

## 2021-12-18 ENCOUNTER — PHARMACY VISIT (OUTPATIENT)
Dept: PHARMACY | Facility: MEDICAL CENTER | Age: 61
End: 2021-12-18
Payer: COMMERCIAL

## 2021-12-18 PROCEDURE — RXMED WILLOW AMBULATORY MEDICATION CHARGE: Performed by: INTERNAL MEDICINE

## 2021-12-18 RX ORDER — CX-024414 0.2 MG/ML
INJECTION, SUSPENSION INTRAMUSCULAR
Qty: 0.25 ML | Refills: 0 | Status: SHIPPED | OUTPATIENT
Start: 2021-12-18 | End: 2022-09-06

## 2021-12-27 DIAGNOSIS — E78.2 MIXED HYPERLIPIDEMIA: ICD-10-CM

## 2021-12-27 RX ORDER — ATORVASTATIN CALCIUM 10 MG/1
10 TABLET, FILM COATED ORAL
Qty: 90 TABLET | Refills: 0 | Status: SHIPPED | OUTPATIENT
Start: 2021-12-27 | End: 2022-03-25

## 2021-12-27 NOTE — TELEPHONE ENCOUNTER
Requested Prescriptions     Pending Prescriptions Disp Refills   • atorvastatin (LIPITOR) 10 MG Tab 90 Tablet 0     Sig: Take 1 Tablet by mouth at bedtime. Needs to establish with new PCP for further refills.       JEAN PAUL Avalos.

## 2021-12-27 NOTE — TELEPHONE ENCOUNTER
Phone Number Called: [phone] (home)     Call outcome: Did not leave a detailed message. Requested patient to call back.    Message: LVM to call and schedule NP apt.     .Was the patient seen in the last year in this department? 07/15/21    Does patient have an active prescription for medications requested? Yes    Received Request Via: Pharmacy    Hospital Outpatient Visit on 07/15/2021   Component Date Value   • Cytology Reg 07/15/2021 See Path Report    • Source 07/15/2021 Endo/Cervical    • HPV Genotype 16 07/15/2021 Negative    • HPV Genotype 18 07/15/2021 Negative    • HPV Other High Risk Deanne* 07/15/2021 Negative

## 2021-12-30 ENCOUNTER — OFFICE VISIT (OUTPATIENT)
Dept: URGENT CARE | Facility: PHYSICIAN GROUP | Age: 61
End: 2021-12-30
Payer: COMMERCIAL

## 2021-12-30 ENCOUNTER — HOSPITAL ENCOUNTER (OUTPATIENT)
Dept: RADIOLOGY | Facility: MEDICAL CENTER | Age: 61
End: 2021-12-30
Attending: PHYSICIAN ASSISTANT
Payer: COMMERCIAL

## 2021-12-30 VITALS
OXYGEN SATURATION: 97 % | SYSTOLIC BLOOD PRESSURE: 126 MMHG | DIASTOLIC BLOOD PRESSURE: 72 MMHG | WEIGHT: 160.7 LBS | RESPIRATION RATE: 16 BRPM | BODY MASS INDEX: 25.22 KG/M2 | HEIGHT: 67 IN | HEART RATE: 63 BPM | TEMPERATURE: 98.1 F

## 2021-12-30 DIAGNOSIS — M25.559 HIP PAIN: ICD-10-CM

## 2021-12-30 DIAGNOSIS — G57.02 PIRIFORMIS SYNDROME, LEFT: ICD-10-CM

## 2021-12-30 PROCEDURE — 73502 X-RAY EXAM HIP UNI 2-3 VIEWS: CPT | Mod: LT

## 2021-12-30 PROCEDURE — 99214 OFFICE O/P EST MOD 30 MIN: CPT | Performed by: PHYSICIAN ASSISTANT

## 2021-12-30 RX ORDER — CYCLOBENZAPRINE HCL 5 MG
5-10 TABLET ORAL
Qty: 15 TABLET | Refills: 0 | Status: SHIPPED
Start: 2021-12-30 | End: 2022-05-12

## 2021-12-30 RX ORDER — PREDNISONE 10 MG/1
40 TABLET ORAL DAILY
Qty: 20 TABLET | Refills: 0 | Status: SHIPPED | OUTPATIENT
Start: 2021-12-30 | End: 2022-01-04

## 2021-12-30 ASSESSMENT — ENCOUNTER SYMPTOMS
CONSTIPATION: 0
FEVER: 0
MYALGIAS: 0
NAUSEA: 0
EYE PAIN: 0
COUGH: 0
SHORTNESS OF BREATH: 0
DIARRHEA: 0
HEADACHES: 0
VOMITING: 0
CHILLS: 0
SORE THROAT: 0
ABDOMINAL PAIN: 0

## 2021-12-30 ASSESSMENT — FIBROSIS 4 INDEX: FIB4 SCORE: 1.05

## 2021-12-30 ASSESSMENT — PAIN SCALES - GENERAL: PAINLEVEL: 8=MODERATE-SEVERE PAIN

## 2021-12-31 NOTE — PROGRESS NOTES
"Subjective:   Mei Sullivan is a 61 y.o. female who presents for Hip Pain (left, no injuries, getting worse, radiating in back of the leg, x2 days)      This is a pleasant 61-year-old female who presents complaining of pain in the posterior aspect of the left hip, more deep within the gluteus.  She notes that occasionally she gets an electric shooting pain down the posterior aspect of the leg terminating mid thigh.  She does not recall any trauma or injury.  She has pain when she extends her leg all the way, the pain is worse with prolonged sitting.  Onset of symptoms was after a long car ride.  She denies any numbness or tingling, weakness, or bladder or bowel dysfunction.  No trauma or injury      Review of Systems   Constitutional: Negative for chills and fever.   HENT: Negative for congestion, ear pain and sore throat.    Eyes: Negative for pain.   Respiratory: Negative for cough and shortness of breath.    Cardiovascular: Negative for chest pain.   Gastrointestinal: Negative for abdominal pain, constipation, diarrhea, nausea and vomiting.   Genitourinary: Negative for dysuria.   Musculoskeletal: Negative for myalgias.   Skin: Negative for rash.   Neurological: Negative for headaches.       Medications, Allergies, and current problem list reviewed today in Epic.     Objective:     /72   Pulse 63   Temp 36.7 °C (98.1 °F) (Temporal)   Resp 16   Ht 1.702 m (5' 7\")   Wt 72.9 kg (160 lb 11.2 oz)   SpO2 97%     Physical Exam  Vitals reviewed.   Constitutional:       Appearance: Normal appearance.   HENT:      Head: Normocephalic and atraumatic.      Right Ear: External ear normal.      Left Ear: External ear normal.      Nose: Nose normal.      Mouth/Throat:      Mouth: Mucous membranes are moist.   Eyes:      Conjunctiva/sclera: Conjunctivae normal.   Cardiovascular:      Rate and Rhythm: Normal rate.   Pulmonary:      Effort: Pulmonary effort is normal.   Musculoskeletal:      Comments: Focal tenderness " along left piriformis and mild over the last SI joint.  5 5 strength of bilateral lower extremities.  Negative logroll.  Nontender over greater trochanter.  Pain with active hip flexion, no pain with abduction or abduction.   Skin:     General: Skin is warm and dry.      Capillary Refill: Capillary refill takes less than 2 seconds.   Neurological:      Mental Status: She is alert and oriented to person, place, and time.         RADIOLOGY RESULTS   DX-HIP-COMPLETE - UNILATERAL 2+ LEFT    Result Date: 12/30/2021 12/30/2021 6:10 PM HISTORY/REASON FOR EXAM:  Atraumatic Pelvic/Hip Pain; atraumatic posterior hip pain.. Left hip pain TECHNIQUE/EXAM DESCRIPTION AND NUMBER OF VIEWS:  2 views of the LEFT hip. COMPARISON: 8/14/2018 FINDINGS: There is no fracture or dislocation. The visualized osseous structures are in anatomic alignment. Joint spaces are preserved. Bone mineralization is age-appropriate.. Lower lumbar degenerative changes.     1.  Negative left hip. 2.  Lower lumbar disc degeneration.             Assessment/Plan:     Diagnosis and associated orders:     1. Hip pain  CANCELED: DX-HIP-UNILATERAL-WITH PELVIS-1 VIEW LEFT   2. Piriformis syndrome, left  predniSONE (DELTASONE) 10 MG Tab    cyclobenzaprine (FLEXERIL) 5 mg tablet    Referral to Sports Medicine      Comments/MDM:     History and physical consistent with piriformis syndrome, patient was provided with clinical advisor fact she on this condition as well as recommendations take Tylenol she is already on a nonsteroidal anti-inflammatory.  We will send Flexeril to the pharmacy, patient cautioned about sedating nature of this thing.  Discussed with her the nerve inflammation may benefit from a trial of prednisone.  Discussed behavior changes including standing, stretching, changing seated posture       Differential diagnosis, natural history, supportive care, and indications for immediate follow-up discussed.    Advised the patient to follow-up with the  primary care physician for recheck, reevaluation, and consideration of further management.    Please note that this dictation was created using voice recognition software. I have made a reasonable attempt to correct obvious errors, but I expect that there are errors of grammar and possibly content that I did not discover before finalizing the note.    This note was electronically signed by Rafael Lazo PA-C

## 2022-02-23 DIAGNOSIS — M15.9 PRIMARY OSTEOARTHRITIS INVOLVING MULTIPLE JOINTS: ICD-10-CM

## 2022-02-23 RX ORDER — ETODOLAC 400 MG/1
400 TABLET, FILM COATED ORAL 2 TIMES DAILY WITH MEALS
Qty: 60 TABLET | Refills: 0 | Status: SHIPPED | OUTPATIENT
Start: 2022-02-23 | End: 2022-09-06 | Stop reason: SDUPTHER

## 2022-03-10 ENCOUNTER — APPOINTMENT (OUTPATIENT)
Dept: MEDICAL GROUP | Facility: PHYSICIAN GROUP | Age: 62
End: 2022-03-10
Payer: COMMERCIAL

## 2022-05-12 ENCOUNTER — OFFICE VISIT (OUTPATIENT)
Dept: URGENT CARE | Facility: PHYSICIAN GROUP | Age: 62
End: 2022-05-12
Payer: COMMERCIAL

## 2022-05-12 VITALS
HEIGHT: 67 IN | HEART RATE: 70 BPM | BODY MASS INDEX: 25.11 KG/M2 | DIASTOLIC BLOOD PRESSURE: 72 MMHG | OXYGEN SATURATION: 98 % | TEMPERATURE: 97.3 F | WEIGHT: 160 LBS | SYSTOLIC BLOOD PRESSURE: 118 MMHG | RESPIRATION RATE: 18 BRPM

## 2022-05-12 DIAGNOSIS — Z76.0 MEDICATION REFILL: ICD-10-CM

## 2022-05-12 PROCEDURE — 99213 OFFICE O/P EST LOW 20 MIN: CPT | Performed by: PHYSICIAN ASSISTANT

## 2022-05-12 RX ORDER — PANTOPRAZOLE SODIUM 40 MG/1
TABLET, DELAYED RELEASE ORAL
Qty: 30 TABLET | Refills: 0 | Status: SHIPPED | OUTPATIENT
Start: 2022-05-12

## 2022-05-12 ASSESSMENT — FIBROSIS 4 INDEX: FIB4 SCORE: 1.07

## 2022-05-13 NOTE — PROGRESS NOTES
Subjective:   Mei Sullivan is a 62 y.o. female who presents for Medication Refill (In between providers. Needs refill on pantoprazole 40mg. )      HPI  Patient is here for medication refill.  She is requesting pantoprazole 40 mg daily.  She is in between PCPs right now and she has a PCP appointment in a couple weeks.  She ran out of pantoprazole for her GERD.  She states she is asymptomatic.  Denies any fever, chills, abdominal pain, nausea, vomiting.        Medications:    • atorvastatin Tabs  • Caltrate 600+D Plus Tabs  • cyclobenzaprine  • estradiol  • etodolac  • Moderna COVID-19 Vaccine Susp  • pantoprazole Tbec  • Protein Powd    Allergies: Fosamax    Problem List: Mei Sullivan does not have any pertinent problems on file.    Surgical History:  Past Surgical History:   Procedure Laterality Date   • CATARACT PHACO WITH IOL Right 7/10/2018    Procedure: CATARACT PHACO WITH IOL;  Surgeon: Martell Rai M.D.;  Location: SURGERY SAME DAY [facility];  Service: Ophthalmology   • CATARACT PHACO WITH IOL Left 6/26/2018    Procedure: CATARACT PHACO WITH IOL;  Surgeon: Martell Rai M.D.;  Location: SURGERY SAME DAY [facility];  Service: Ophthalmology   • NEUROMA EXCISION  10/18/2013    Performed by FELIPE DoanP.SARAH at SURGERY SAME DAY [facility]   • NEUROMA MORTONS EXCISION  1990    Left foot   • LAMINOTOMY  1985    coccyxectomy   • LAPAROSCOPY  1982   • TONSILLECTOMY AND ADENOIDECTOMY  1972   • OPEN REDUCTION      sacrum/coccyx       Past Social Hx: Mei Sullivan  reports that she quit smoking about 13 years ago. Her smoking use included cigarettes. She has a 50.00 pack-year smoking history. She has never used smokeless tobacco. She reports current alcohol use of about 0.6 oz of alcohol per week. She reports that she does not use drugs.     Past Family Hx:  Mei Sullivan family history includes Hypertension in her father.     Problem list, medications, and allergies reviewed by myself  "today in Epic.     Objective:     /72   Pulse 70   Temp 36.3 °C (97.3 °F) (Temporal)   Resp 18   Ht 1.702 m (5' 7\")   Wt 72.6 kg (160 lb)   LMP 08/01/2006   SpO2 98%   BMI 25.06 kg/m²     Physical Exam  Vitals reviewed.   Constitutional:       General: She is not in acute distress.     Appearance: Normal appearance. She is not ill-appearing or toxic-appearing.   Eyes:      Conjunctiva/sclera: Conjunctivae normal.      Pupils: Pupils are equal, round, and reactive to light.   Cardiovascular:      Rate and Rhythm: Normal rate.   Pulmonary:      Effort: Pulmonary effort is normal.   Neurological:      General: No focal deficit present.      Mental Status: She is alert and oriented to person, place, and time.   Psychiatric:         Mood and Affect: Mood normal.         Behavior: Behavior normal.         Diagnosis and associated orders:     1. Medication refill    - pantoprazole (PROTONIX) 40 MG Tablet Delayed Response; TAKE ONE TABLET BY MOUTH DAILY  Dispense: 30 Tablet; Refill: 0       Comments/MDM:     • Mediation refilled.   • Follow up at scheduled PCP appointment.        Patient expresses understanding and agrees to plan. Patient denies any other questions or concerns.     Please note that this dictation was created using voice recognition software. I have made a reasonable attempt to correct obvious errors, but I expect that there are errors of grammar and possibly content that I did not discover before finalizing the note.    This note was electronically signed by Tomasz Jeffrey PA-C  "

## 2022-05-18 ENCOUNTER — OFFICE VISIT (OUTPATIENT)
Dept: URGENT CARE | Facility: PHYSICIAN GROUP | Age: 62
End: 2022-05-18
Payer: COMMERCIAL

## 2022-05-18 ENCOUNTER — HOSPITAL ENCOUNTER (OUTPATIENT)
Dept: RADIOLOGY | Facility: MEDICAL CENTER | Age: 62
End: 2022-05-18
Attending: NURSE PRACTITIONER
Payer: COMMERCIAL

## 2022-05-18 VITALS
RESPIRATION RATE: 16 BRPM | BODY MASS INDEX: 25.58 KG/M2 | WEIGHT: 163 LBS | OXYGEN SATURATION: 98 % | DIASTOLIC BLOOD PRESSURE: 72 MMHG | HEIGHT: 67 IN | TEMPERATURE: 98.7 F | HEART RATE: 65 BPM | SYSTOLIC BLOOD PRESSURE: 118 MMHG

## 2022-05-18 DIAGNOSIS — M67.40 GANGLION CYST: ICD-10-CM

## 2022-05-18 DIAGNOSIS — S69.91XA HAND INJURY, RIGHT, INITIAL ENCOUNTER: ICD-10-CM

## 2022-05-18 DIAGNOSIS — S67.21XA CRUSHING INJURY OF RIGHT HAND, INITIAL ENCOUNTER: ICD-10-CM

## 2022-05-18 PROCEDURE — 99213 OFFICE O/P EST LOW 20 MIN: CPT | Performed by: NURSE PRACTITIONER

## 2022-05-18 PROCEDURE — 73130 X-RAY EXAM OF HAND: CPT | Mod: RT

## 2022-05-18 ASSESSMENT — ENCOUNTER SYMPTOMS
FALLS: 0
MYALGIAS: 1
SENSORY CHANGE: 0
BRUISES/BLEEDS EASILY: 0
WEAKNESS: 0
CHILLS: 0
TINGLING: 0

## 2022-05-18 ASSESSMENT — FIBROSIS 4 INDEX: FIB4 SCORE: 1.07

## 2022-05-19 NOTE — PROGRESS NOTES
Subjective     Mei Sullivan is a 62 y.o. female who presents with Hand Injury (1WEEK AGO, SLAMMED HAND AGAINST FRIDGE. PAINFUL TO THE TOUCH, SORE.)            HPI  States hit back of right hand on refrigerator door approx 7 days ago. States localized swelling to dorsal aspect of right hand and general redness to hand. Right handed and has been using hand with no problems but has discomfort to back of hand. Ice and NSAID. No noted numbness/tingling or weakness with use.     PMH:  has a past medical history of Arthritis, Cataract, Dyspnea, GERD (gastroesophageal reflux disease), Heart burn, High cholesterol, Indigestion, Palpitations, and Pneumonia (1989).  MEDS:   Current Outpatient Medications:   •  pantoprazole (PROTONIX) 40 MG Tablet Delayed Response, TAKE ONE TABLET BY MOUTH DAILY, Disp: 30 Tablet, Rfl: 0  •  atorvastatin (LIPITOR) 10 MG Tab, Take 1 Tablet by mouth at bedtime. (Need to follow-up with new provider.), Disp: 90 Tablet, Rfl: 0  •  etodolac (LODINE) 400 MG tablet, Take 1 Tablet by mouth 2 times a day with meals. (No refills.  Need to establish with new provider)., Disp: 60 Tablet, Rfl: 0  •  COVID-19 mRNA vaccine, Moderna, (MODERNA COVID-19 VACCINE) 100 MCG/0.5ML Suspension injection, Inject  into the shoulder, thigh, or buttocks., Disp: 0.25 mL, Rfl: 0  •  estradiol (ESTRACE) 0.1 MG/GM vaginal cream, Insert 1 g into the vagina every 3 days., Disp: 42.5 g, Rfl: 3  •  Protein POWD, Take 2 Tbsp by mouth every 48 hours. Patient uses Isopure Powder in milk with banana and this helps hot flashes , Disp: , Rfl:   •  Calcium Carbonate-Vit D-Min (CALTRATE 600+D PLUS) 600-400 MG-UNIT TABS, Take 2 Tabs by mouth every day., Disp: , Rfl:   ALLERGIES:   Allergies   Allergen Reactions   • Fosamax      Esophageal Pain     SURGHX:   Past Surgical History:   Procedure Laterality Date   • CATARACT PHACO WITH IOL Right 7/10/2018    Procedure: CATARACT PHACO WITH IOL;  Surgeon: Martell Rai M.D.;  Location:  "SURGERY SAME DAY [facility];  Service: Ophthalmology   • CATARACT PHACO WITH IOL Left 6/26/2018    Procedure: CATARACT PHACO WITH IOL;  Surgeon: Martell Rai M.D.;  Location: SURGERY SAME DAY [facility];  Service: Ophthalmology   • NEUROMA EXCISION  10/18/2013    Performed by Jesenia Baxter D.P.M. at SURGERY SAME DAY [facility]   • NEUROMA MORTONS EXCISION  1990    Left foot   • LAMINOTOMY  1985    coccyxectomy   • LAPAROSCOPY  1982   • TONSILLECTOMY AND ADENOIDECTOMY  1972   • OPEN REDUCTION      sacrum/coccyx     SOCHX:  reports that she quit smoking about 13 years ago. Her smoking use included cigarettes. She has a 50.00 pack-year smoking history. She has never used smokeless tobacco. She reports current alcohol use of about 0.6 oz of alcohol per week. She reports that she does not use drugs.  FH: Family history was reviewed, no pertinent findings to report    Review of Systems   Constitutional: Negative for chills and malaise/fatigue.   Musculoskeletal: Positive for myalgias. Negative for falls and joint pain.   Skin: Negative for itching and rash.   Neurological: Negative for tingling, sensory change and weakness.   Endo/Heme/Allergies: Does not bruise/bleed easily.   All other systems reviewed and are negative.             Objective     /72   Pulse 65   Temp 37.1 °C (98.7 °F) (Tympanic)   Resp 16   Ht 1.702 m (5' 7\")   Wt 73.9 kg (163 lb)   LMP 08/01/2006   SpO2 98%   BMI 25.53 kg/m²      Physical Exam  Vitals reviewed.   Constitutional:       General: She is awake. She is not in acute distress.     Appearance: Normal appearance. She is well-developed. She is not ill-appearing, toxic-appearing or diaphoretic.   HENT:      Head: Normocephalic.   Eyes:      Pupils: Pupils are equal, round, and reactive to light.   Cardiovascular:      Rate and Rhythm: Normal rate.   Pulmonary:      Effort: Pulmonary effort is normal.   Musculoskeletal:      Right wrist: Normal.      Right hand: " Swelling, tenderness and bony tenderness present. No deformity or lacerations. Normal range of motion. Normal strength. Normal sensation. There is no disruption of two-point discrimination. Normal capillary refill. Normal pulse.      Comments: Dorsal cyst-like swelling at right hand at 2nd-3rd metacarpals. Full range of motion with . Med assist applied ace wrap to right hand.   Skin:     General: Skin is warm and dry.      Findings: Bruising and signs of injury present. No abrasion, burn, erythema, laceration, petechiae, rash or wound.      Comments: Generalized erythema/bruising to dorsal aspect of right hand without generalized swelling of hand, no open wounds.    Neurological:      Mental Status: She is alert and oriented to person, place, and time.   Psychiatric:         Behavior: Behavior is cooperative.                             Assessment & Plan        1. Hand injury, right, initial encounter    - DX-HAND 3+ RIGHT; Future  - Referral to Sports Medicine    2. Crushing injury of right hand, initial encounter    - Referral to Sports Medicine    3. Ganglion cyst    - Referral to Sports Medicine    -May use over the counter NSAID as needed for pain/swelling  -May use cool compresses for swelling as needed  -May utilize RICE method as needed, may use ace wrap to support hand as well as compress cyst  -Avoid excessive hand use or activities that can further injury hand or cause pain  -May apply topical analgesics as needed   -Perform proper body mechanics with lift/carry, push/pull  -Monitor for deformity, numbness/tingling in fingers, decreased range of motion with weight lifting- need re-evaluation  Follow up with Sports Med

## 2022-08-01 ENCOUNTER — TELEPHONE (OUTPATIENT)
Dept: SCHEDULING | Facility: IMAGING CENTER | Age: 62
End: 2022-08-01

## 2022-09-03 SDOH — HEALTH STABILITY: PHYSICAL HEALTH: ON AVERAGE, HOW MANY DAYS PER WEEK DO YOU ENGAGE IN MODERATE TO STRENUOUS EXERCISE (LIKE A BRISK WALK)?: 4 DAYS

## 2022-09-03 SDOH — ECONOMIC STABILITY: HOUSING INSECURITY: IN THE LAST 12 MONTHS, HOW MANY PLACES HAVE YOU LIVED?: 1

## 2022-09-03 SDOH — ECONOMIC STABILITY: HOUSING INSECURITY
IN THE LAST 12 MONTHS, WAS THERE A TIME WHEN YOU DID NOT HAVE A STEADY PLACE TO SLEEP OR SLEPT IN A SHELTER (INCLUDING NOW)?: NO

## 2022-09-03 SDOH — HEALTH STABILITY: PHYSICAL HEALTH: ON AVERAGE, HOW MANY MINUTES DO YOU ENGAGE IN EXERCISE AT THIS LEVEL?: 20 MIN

## 2022-09-03 SDOH — ECONOMIC STABILITY: TRANSPORTATION INSECURITY
IN THE PAST 12 MONTHS, HAS LACK OF TRANSPORTATION KEPT YOU FROM MEETINGS, WORK, OR FROM GETTING THINGS NEEDED FOR DAILY LIVING?: NO

## 2022-09-03 SDOH — ECONOMIC STABILITY: INCOME INSECURITY: IN THE LAST 12 MONTHS, WAS THERE A TIME WHEN YOU WERE NOT ABLE TO PAY THE MORTGAGE OR RENT ON TIME?: NO

## 2022-09-03 SDOH — ECONOMIC STABILITY: FOOD INSECURITY: WITHIN THE PAST 12 MONTHS, YOU WORRIED THAT YOUR FOOD WOULD RUN OUT BEFORE YOU GOT MONEY TO BUY MORE.: NEVER TRUE

## 2022-09-03 SDOH — ECONOMIC STABILITY: FOOD INSECURITY: WITHIN THE PAST 12 MONTHS, THE FOOD YOU BOUGHT JUST DIDN'T LAST AND YOU DIDN'T HAVE MONEY TO GET MORE.: NEVER TRUE

## 2022-09-03 SDOH — ECONOMIC STABILITY: INCOME INSECURITY: HOW HARD IS IT FOR YOU TO PAY FOR THE VERY BASICS LIKE FOOD, HOUSING, MEDICAL CARE, AND HEATING?: NOT HARD AT ALL

## 2022-09-03 SDOH — ECONOMIC STABILITY: TRANSPORTATION INSECURITY
IN THE PAST 12 MONTHS, HAS THE LACK OF TRANSPORTATION KEPT YOU FROM MEDICAL APPOINTMENTS OR FROM GETTING MEDICATIONS?: NO

## 2022-09-03 SDOH — ECONOMIC STABILITY: TRANSPORTATION INSECURITY
IN THE PAST 12 MONTHS, HAS LACK OF RELIABLE TRANSPORTATION KEPT YOU FROM MEDICAL APPOINTMENTS, MEETINGS, WORK OR FROM GETTING THINGS NEEDED FOR DAILY LIVING?: NO

## 2022-09-03 SDOH — HEALTH STABILITY: MENTAL HEALTH
STRESS IS WHEN SOMEONE FEELS TENSE, NERVOUS, ANXIOUS, OR CAN'T SLEEP AT NIGHT BECAUSE THEIR MIND IS TROUBLED. HOW STRESSED ARE YOU?: ONLY A LITTLE

## 2022-09-03 ASSESSMENT — SOCIAL DETERMINANTS OF HEALTH (SDOH)
IN A TYPICAL WEEK, HOW MANY TIMES DO YOU TALK ON THE PHONE WITH FAMILY, FRIENDS, OR NEIGHBORS?: MORE THAN THREE TIMES A WEEK
ARE YOU MARRIED, WIDOWED, DIVORCED, SEPARATED, NEVER MARRIED, OR LIVING WITH A PARTNER?: LIVING WITH PARTNER
HOW HARD IS IT FOR YOU TO PAY FOR THE VERY BASICS LIKE FOOD, HOUSING, MEDICAL CARE, AND HEATING?: NOT HARD AT ALL
DO YOU BELONG TO ANY CLUBS OR ORGANIZATIONS SUCH AS CHURCH GROUPS UNIONS, FRATERNAL OR ATHLETIC GROUPS, OR SCHOOL GROUPS?: NO
HOW OFTEN DO YOU ATTENT MEETINGS OF THE CLUB OR ORGANIZATION YOU BELONG TO?: NEVER
HOW OFTEN DO YOU ATTEND CHURCH OR RELIGIOUS SERVICES?: NEVER
HOW OFTEN DO YOU ATTEND CHURCH OR RELIGIOUS SERVICES?: NEVER
WITHIN THE PAST 12 MONTHS, YOU WORRIED THAT YOUR FOOD WOULD RUN OUT BEFORE YOU GOT THE MONEY TO BUY MORE: NEVER TRUE
IN A TYPICAL WEEK, HOW MANY TIMES DO YOU TALK ON THE PHONE WITH FAMILY, FRIENDS, OR NEIGHBORS?: MORE THAN THREE TIMES A WEEK
HOW OFTEN DO YOU HAVE SIX OR MORE DRINKS ON ONE OCCASION: NEVER
ARE YOU MARRIED, WIDOWED, DIVORCED, SEPARATED, NEVER MARRIED, OR LIVING WITH A PARTNER?: LIVING WITH PARTNER
HOW OFTEN DO YOU HAVE A DRINK CONTAINING ALCOHOL: 2-4 TIMES A MONTH
DO YOU BELONG TO ANY CLUBS OR ORGANIZATIONS SUCH AS CHURCH GROUPS UNIONS, FRATERNAL OR ATHLETIC GROUPS, OR SCHOOL GROUPS?: NO
HOW MANY DRINKS CONTAINING ALCOHOL DO YOU HAVE ON A TYPICAL DAY WHEN YOU ARE DRINKING: 1 OR 2
HOW OFTEN DO YOU ATTENT MEETINGS OF THE CLUB OR ORGANIZATION YOU BELONG TO?: NEVER
HOW OFTEN DO YOU GET TOGETHER WITH FRIENDS OR RELATIVES?: THREE TIMES A WEEK
HOW OFTEN DO YOU GET TOGETHER WITH FRIENDS OR RELATIVES?: THREE TIMES A WEEK

## 2022-09-03 ASSESSMENT — LIFESTYLE VARIABLES
HOW MANY STANDARD DRINKS CONTAINING ALCOHOL DO YOU HAVE ON A TYPICAL DAY: 1 OR 2
AUDIT-C TOTAL SCORE: 2
HOW OFTEN DO YOU HAVE A DRINK CONTAINING ALCOHOL: 2-4 TIMES A MONTH
SKIP TO QUESTIONS 9-10: 1
HOW OFTEN DO YOU HAVE SIX OR MORE DRINKS ON ONE OCCASION: NEVER

## 2022-09-06 ENCOUNTER — OFFICE VISIT (OUTPATIENT)
Dept: MEDICAL GROUP | Facility: PHYSICIAN GROUP | Age: 62
End: 2022-09-06
Payer: COMMERCIAL

## 2022-09-06 VITALS
TEMPERATURE: 97.8 F | DIASTOLIC BLOOD PRESSURE: 72 MMHG | WEIGHT: 166 LBS | HEART RATE: 90 BPM | HEIGHT: 67 IN | SYSTOLIC BLOOD PRESSURE: 114 MMHG | BODY MASS INDEX: 26.06 KG/M2 | OXYGEN SATURATION: 98 %

## 2022-09-06 DIAGNOSIS — K22.719 BARRETT'S ESOPHAGUS WITH DYSPLASIA: ICD-10-CM

## 2022-09-06 DIAGNOSIS — F51.01 PRIMARY INSOMNIA: ICD-10-CM

## 2022-09-06 DIAGNOSIS — Z12.31 BREAST CANCER SCREENING BY MAMMOGRAM: ICD-10-CM

## 2022-09-06 DIAGNOSIS — Z87.39 HISTORY OF OSTEOPENIA: ICD-10-CM

## 2022-09-06 DIAGNOSIS — M15.9 PRIMARY OSTEOARTHRITIS INVOLVING MULTIPLE JOINTS: ICD-10-CM

## 2022-09-06 DIAGNOSIS — Z76.89 ENCOUNTER TO ESTABLISH CARE: ICD-10-CM

## 2022-09-06 DIAGNOSIS — Z87.891 FORMER SMOKER: ICD-10-CM

## 2022-09-06 DIAGNOSIS — Z00.00 WELLNESS EXAMINATION: ICD-10-CM

## 2022-09-06 DIAGNOSIS — E78.00 HIGH CHOLESTEROL: ICD-10-CM

## 2022-09-06 DIAGNOSIS — Z87.891 HISTORY OF CIGARETTE SMOKING: ICD-10-CM

## 2022-09-06 PROCEDURE — 99214 OFFICE O/P EST MOD 30 MIN: CPT | Performed by: NURSE PRACTITIONER

## 2022-09-06 RX ORDER — ETODOLAC 400 MG/1
400 TABLET, FILM COATED ORAL
Qty: 60 TABLET | Refills: 0 | Status: SHIPPED | OUTPATIENT
Start: 2022-09-06 | End: 2022-10-04

## 2022-09-06 ASSESSMENT — FIBROSIS 4 INDEX: FIB4 SCORE: 1.07

## 2022-09-06 ASSESSMENT — PATIENT HEALTH QUESTIONNAIRE - PHQ9: CLINICAL INTERPRETATION OF PHQ2 SCORE: 0

## 2022-09-06 NOTE — PROGRESS NOTES
Subjective:     CC:    Chief Complaint   Patient presents with    Establish Care    Gastrophageal Reflux     Medication management     Medication Refill     etodolac        HISTORY OF THE PRESENT ILLNESS: Patient is a 62 y.o. female, here today to establish care. Prior PCP was Jennifer Aparicio. The below problems were discussed/reviewed at this visit:    Problem   High Cholesterol    Elevated cholesterol in the past  Ran out of Atorvastatin 2021 when prior PCP left, has not refilled since     History of Osteopenia    Osteopenia in 2014  Normal dexa scan 2017     Former Smoker    2 PPD x25 years cigarette smoke; quit 2009  Denies cough, SOB, CP, dyspnea  She would like one more LDCT screen (prior showed stable nodule right side)    2/2019  IMPRESSION:   Stable chest CT with a single 3 mm right upper lobe noncalcified nodule and is likely postinflammatory     Lung RADS: 2 - Benign Appearance or Behavior: Nodules with a very low likelihood of becoming a clinically active cancer due to size or lack of growth     Findings: solid nodule(s): less than 6 mm or new less than 4 mm  part solid nodule(s): less than 6 mm total diameter on baseline screening  non solid nodule(s) (GGN): less than 20 mm OR greater than or equal to 20 mm and unchanged or slowly growing  category 3 or 4 nodules unchanged for greater than or equal to 3 months     Management: Continue annual screening with LDCT in 12 months     Post Menopausal Syndrome    Reports prior pain with sexual intercourse. This got better when she was started on Estradiol 1gm vag cream every 3 days.  Has not been sexually active recently so has not had need for this  She will let me know if/when she needs refills     Cox's Esophagus    States was on Pantoprazole 40mg for years, ran out in 5/2022 & switched to OTC Nexium 20mg   S/p esophageal stricture dilation again 2021; per GI barretts improving     Primary Insomnia    States she tried Ambien in the past & fell in the  middle of the night.  She was unsure if she had grogginess from benadryl or melatonin.  Currently taking Kratom QHS for sleep     Osteoarthritis    Arthritis in thumbs, bilateral lower back for years.   Taking prn Etodolac 400mg which she rarely uses; she takes prn tylenol for milder pain.        Patient Active Problem List   Diagnosis    Constipation    Osteoarthritis    Primary insomnia    Bulimia    Cox's esophagus    S/P balloon dilatation of esophageal stricture    Post menopausal syndrome    Coccyx pain    Former smoker    Well woman exam with routine gynecological exam    High cholesterol    History of osteopenia       Past Medical History:   Diagnosis Date    Arthritis     Osteo; both wrists    Cataract     Dyspnea     GERD (gastroesophageal reflux disease)     Heart burn     High cholesterol     Indigestion     Palpitations     Pneumonia 1989        Current Outpatient Medications Ordered in Epic   Medication Sig Dispense Refill    Esomeprazole Magnesium (NEXIUM PO) Take 20 mg by mouth every day.      etodolac (LODINE) 400 MG tablet Take 1 Tablet by mouth 2 times daily with meals as needed (arthritis pain). 60 Tablet 0    pantoprazole (PROTONIX) 40 MG Tablet Delayed Response TAKE ONE TABLET BY MOUTH DAILY 30 Tablet 0    estradiol (ESTRACE) 0.1 MG/GM vaginal cream Insert 1 g into the vagina every 3 days. 42.5 g 3    Protein POWD Take 2 Tbsp by mouth every 48 hours. Patient uses Isopure Powder in milk with banana and this helps hot flashes       atorvastatin (LIPITOR) 10 MG Tab Take 1 Tablet by mouth at bedtime. (Need to follow-up with new provider.) (Patient not taking: Reported on 9/6/2022) 90 Tablet 0     No current Epic-ordered facility-administered medications on file.        Past Surgical History:   Procedure Laterality Date    CATARACT PHACO WITH IOL Right 7/10/2018    Procedure: CATARACT PHACO WITH IOL;  Surgeon: Martell Rai M.D.;  Location: SURGERY SAME DAY [facility];  Service:  "Ophthalmology    CATARACT PHACO WITH IOL Left 6/26/2018    Procedure: CATARACT PHACO WITH IOL;  Surgeon: Martell Rai M.D.;  Location: SURGERY SAME DAY [facility];  Service: Ophthalmology    NEUROMA EXCISION  10/18/2013    Performed by Jesenia Baxter D.P.M. at SURGERY SAME DAY [facility]    NEUROMA MORTONS EXCISION  1990    Left foot    LAMINOTOMY  1985    coccyxectomy    LAPAROSCOPY  1982    TONSILLECTOMY AND ADENOIDECTOMY  1972    OPEN REDUCTION      sacrum/coccyx        Allergies:  Fosamax    Health Maintenance: Completed    ROS:   Review of Systems   Constitutional: Negative.  Negative for fever and malaise/fatigue.   HENT: Negative.     Eyes: Negative.    Respiratory:  Negative for cough, sputum production and shortness of breath.    Cardiovascular:  Negative for chest pain, palpitations and leg swelling.   Gastrointestinal: Negative.    Genitourinary: Negative.    Musculoskeletal:  Positive for back pain.   Neurological: Negative.    Endo/Heme/Allergies: Negative.    Psychiatric/Behavioral: Negative.         Objective:     Exam: /72   Pulse 90   Temp 36.6 °C (97.8 °F) (Temporal)   Ht 1.702 m (5' 7\")   Wt 75.3 kg (166 lb)   SpO2 98%  Body mass index is 26 kg/m².    Physical Exam  Constitutional:       Appearance: Normal appearance.   Cardiovascular:      Rate and Rhythm: Normal rate and regular rhythm.      Pulses: Normal pulses.      Heart sounds: Normal heart sounds.   Pulmonary:      Effort: Pulmonary effort is normal.      Breath sounds: Normal breath sounds.   Musculoskeletal:         General: Normal range of motion.      Cervical back: Normal range of motion and neck supple.      Right lower leg: No edema.      Left lower leg: No edema.   Skin:     General: Skin is warm and dry.   Neurological:      General: No focal deficit present.      Mental Status: She is alert and oriented to person, place, and time.   Psychiatric:         Mood and Affect: Mood normal.         Behavior: " Behavior normal.         Thought Content: Thought content normal.         Judgment: Judgment normal.     Assessment & Plan:   62 y.o. female with the following -    Problem List Items Addressed This Visit       Osteoarthritis     Pain/flare up in back, hands managed with prn etodolac or tylenol           Relevant Medications    etodolac (LODINE) 400 MG tablet    Primary insomnia     Insomnia currently managed on OTC Kratom QHS  - check LFT with annual labs  - she will switch to melatonin if labs are abnormal          Cox's esophagus     Followed by GI  Symptoms managed on reduced dosing of Nexium 20mg  She will let me know if symptoms recur & we can switch back to pantoproazole         Former smoker     LDCT ordered today  - this will be last one before she no longer meets requirements for further screening         High cholesterol     Not currently on cholesterol reducing medication  - check fasting lipid, restart statin if needed         Relevant Orders    Comp Metabolic Panel    Lipid Profile    History of osteopenia     No recent fall or fractures  - she will be more consistent with daily vit D + calcium  - also with low vit D in the past; check vit D & calcium level with annual labs         Relevant Orders    Comp Metabolic Panel    VITAMIN D,25 HYDROXY (DEFICIENCY)     Other Visit Diagnoses       Encounter to establish care        Wellness examination        Annual labs due    Relevant Orders    CBC WITHOUT DIFFERENTIAL    Comp Metabolic Panel    Lipid Profile    TSH WITH REFLEX TO FT4    History of tobacco abuse (Quit >6 mos ago)        Relevant Orders    CT-LUNG CANCER-SCREENING    Breast cancer screening by mammogram        Relevant Orders    MA-SCREENING MAMMO BILAT W/TOMOSYNTHESIS W/CAD          Medications Prescribed Today:  6. Primary osteoarthritis involving multiple joints  - etodolac (LODINE) 400 MG tablet; Take 1 Tablet by mouth 2 times daily with meals as needed (arthritis pain).  Dispense: 60  Tablet; Refill: 0    Educated in proper administration of medication(s) ordered today including safety, possible SE, risks, benefits, rationale and alternatives to therapy.     Return in about 1 year (around 9/6/2023) for Annual Visit/labs.    Please note that this dictation was created using voice recognition software. I have made every reasonable attempt to correct obvious errors, but I expect that there are errors of grammar and possibly content that I did not discover before finalizing the note.

## 2022-09-08 PROBLEM — N95.1 POST MENOPAUSAL SYNDROME: Status: ACTIVE | Noted: 2017-03-29

## 2022-09-08 ASSESSMENT — ENCOUNTER SYMPTOMS
SPUTUM PRODUCTION: 0
BACK PAIN: 1
NEUROLOGICAL NEGATIVE: 1
COUGH: 0
PSYCHIATRIC NEGATIVE: 1
PALPITATIONS: 0
FEVER: 0
GASTROINTESTINAL NEGATIVE: 1
EYES NEGATIVE: 1
CONSTITUTIONAL NEGATIVE: 1
SHORTNESS OF BREATH: 0

## 2022-09-09 NOTE — ASSESSMENT & PLAN NOTE
Insomnia currently managed on OTC Kratom QHS  - check LFT with annual labs  - she will switch to melatonin if labs are abnormal

## 2022-09-09 NOTE — ASSESSMENT & PLAN NOTE
No recent fall or fractures  - she will be more consistent with daily vit D + calcium  - also with low vit D in the past; check vit D & calcium level with annual labs

## 2022-09-09 NOTE — ASSESSMENT & PLAN NOTE
Not currently on cholesterol reducing medication  - check fasting lipid, restart statin if needed   You can access the FollowMyHealth Patient Portal offered by [facility] by registering at the following website: [email or web address]. By joining [facility]’s FollowMyHealth portal, you will also be able to view your health information using other applications (apps) compatible with our system.

## 2022-09-09 NOTE — ASSESSMENT & PLAN NOTE
LDCT ordered today  - this will be last one before she no longer meets requirements for further screening

## 2022-09-09 NOTE — ASSESSMENT & PLAN NOTE
Followed by GI  Symptoms managed on reduced dosing of Nexium 20mg  She will let me know if symptoms recur & we can switch back to pantoproazole

## 2022-09-26 ENCOUNTER — HOSPITAL ENCOUNTER (OUTPATIENT)
Dept: LAB | Facility: MEDICAL CENTER | Age: 62
End: 2022-09-26
Attending: NURSE PRACTITIONER
Payer: COMMERCIAL

## 2022-09-26 DIAGNOSIS — E78.00 HIGH CHOLESTEROL: ICD-10-CM

## 2022-09-26 DIAGNOSIS — Z87.39 HISTORY OF OSTEOPENIA: ICD-10-CM

## 2022-09-26 DIAGNOSIS — Z00.00 WELLNESS EXAMINATION: ICD-10-CM

## 2022-09-26 LAB
25(OH)D3 SERPL-MCNC: 28 NG/ML (ref 30–100)
ALBUMIN SERPL BCP-MCNC: 3.9 G/DL (ref 3.2–4.9)
ALBUMIN/GLOB SERPL: 1.7 G/DL
ALP SERPL-CCNC: 101 U/L (ref 30–99)
ALT SERPL-CCNC: 20 U/L (ref 2–50)
ANION GAP SERPL CALC-SCNC: 10 MMOL/L (ref 7–16)
AST SERPL-CCNC: 23 U/L (ref 12–45)
BILIRUB SERPL-MCNC: 0.4 MG/DL (ref 0.1–1.5)
BUN SERPL-MCNC: 7 MG/DL (ref 8–22)
CALCIUM SERPL-MCNC: 9.4 MG/DL (ref 8.5–10.5)
CHLORIDE SERPL-SCNC: 106 MMOL/L (ref 96–112)
CHOLEST SERPL-MCNC: 233 MG/DL (ref 100–199)
CO2 SERPL-SCNC: 27 MMOL/L (ref 20–33)
CREAT SERPL-MCNC: 0.87 MG/DL (ref 0.5–1.4)
ERYTHROCYTE [DISTWIDTH] IN BLOOD BY AUTOMATED COUNT: 45.9 FL (ref 35.9–50)
FASTING STATUS PATIENT QL REPORTED: NORMAL
GFR SERPLBLD CREATININE-BSD FMLA CKD-EPI: 75 ML/MIN/1.73 M 2
GLOBULIN SER CALC-MCNC: 2.3 G/DL (ref 1.9–3.5)
GLUCOSE SERPL-MCNC: 83 MG/DL (ref 65–99)
HCT VFR BLD AUTO: 40.4 % (ref 37–47)
HDLC SERPL-MCNC: 70 MG/DL
HGB BLD-MCNC: 13.9 G/DL (ref 12–16)
LDLC SERPL CALC-MCNC: 139 MG/DL
MCH RBC QN AUTO: 32.1 PG (ref 27–33)
MCHC RBC AUTO-ENTMCNC: 34.4 G/DL (ref 33.6–35)
MCV RBC AUTO: 93.3 FL (ref 81.4–97.8)
PLATELET # BLD AUTO: 290 K/UL (ref 164–446)
PMV BLD AUTO: 10.2 FL (ref 9–12.9)
POTASSIUM SERPL-SCNC: 4.2 MMOL/L (ref 3.6–5.5)
PROT SERPL-MCNC: 6.2 G/DL (ref 6–8.2)
RBC # BLD AUTO: 4.33 M/UL (ref 4.2–5.4)
SODIUM SERPL-SCNC: 143 MMOL/L (ref 135–145)
TRIGL SERPL-MCNC: 121 MG/DL (ref 0–149)
TSH SERPL DL<=0.005 MIU/L-ACNC: 4.65 UIU/ML (ref 0.38–5.33)
WBC # BLD AUTO: 5.7 K/UL (ref 4.8–10.8)

## 2022-09-26 PROCEDURE — 82306 VITAMIN D 25 HYDROXY: CPT

## 2022-09-26 PROCEDURE — 84443 ASSAY THYROID STIM HORMONE: CPT

## 2022-09-26 PROCEDURE — 80053 COMPREHEN METABOLIC PANEL: CPT

## 2022-09-26 PROCEDURE — 36415 COLL VENOUS BLD VENIPUNCTURE: CPT

## 2022-09-26 PROCEDURE — 80061 LIPID PANEL: CPT

## 2022-09-26 PROCEDURE — 85027 COMPLETE CBC AUTOMATED: CPT

## 2022-10-02 DIAGNOSIS — M15.9 PRIMARY OSTEOARTHRITIS INVOLVING MULTIPLE JOINTS: ICD-10-CM

## 2022-10-04 RX ORDER — ETODOLAC 400 MG/1
TABLET, FILM COATED ORAL
Qty: 60 TABLET | Refills: 0 | Status: SHIPPED | OUTPATIENT
Start: 2022-10-04 | End: 2022-11-08

## 2022-10-06 ENCOUNTER — HOSPITAL ENCOUNTER (OUTPATIENT)
Dept: RADIOLOGY | Facility: MEDICAL CENTER | Age: 62
End: 2022-10-06
Attending: NURSE PRACTITIONER
Payer: COMMERCIAL

## 2022-10-06 DIAGNOSIS — Z87.891 HISTORY OF CIGARETTE SMOKING: ICD-10-CM

## 2022-10-06 PROCEDURE — 71271 CT THORAX LUNG CANCER SCR C-: CPT

## 2022-10-14 ENCOUNTER — HOSPITAL ENCOUNTER (OUTPATIENT)
Dept: RADIOLOGY | Facility: MEDICAL CENTER | Age: 62
End: 2022-10-14
Attending: NURSE PRACTITIONER
Payer: COMMERCIAL

## 2022-10-14 DIAGNOSIS — Z12.31 BREAST CANCER SCREENING BY MAMMOGRAM: ICD-10-CM

## 2022-10-14 PROCEDURE — 77063 BREAST TOMOSYNTHESIS BI: CPT

## 2022-11-06 DIAGNOSIS — M15.9 PRIMARY OSTEOARTHRITIS INVOLVING MULTIPLE JOINTS: ICD-10-CM

## 2022-11-08 RX ORDER — ETODOLAC 400 MG/1
TABLET, FILM COATED ORAL
Qty: 60 TABLET | Refills: 0 | Status: SHIPPED | OUTPATIENT
Start: 2022-11-08 | End: 2022-12-12

## 2022-11-21 ENCOUNTER — HOSPITAL ENCOUNTER (OUTPATIENT)
Dept: RADIOLOGY | Facility: MEDICAL CENTER | Age: 62
End: 2022-11-21
Attending: FAMILY MEDICINE
Payer: COMMERCIAL

## 2022-11-21 ENCOUNTER — OFFICE VISIT (OUTPATIENT)
Dept: URGENT CARE | Facility: PHYSICIAN GROUP | Age: 62
End: 2022-11-21
Payer: COMMERCIAL

## 2022-11-21 VITALS
HEIGHT: 66 IN | WEIGHT: 156 LBS | OXYGEN SATURATION: 99 % | BODY MASS INDEX: 25.07 KG/M2 | HEART RATE: 60 BPM | RESPIRATION RATE: 16 BRPM | SYSTOLIC BLOOD PRESSURE: 140 MMHG | TEMPERATURE: 98.2 F | DIASTOLIC BLOOD PRESSURE: 72 MMHG

## 2022-11-21 DIAGNOSIS — S23.8XXA SPRAIN OF CHEST WALL, INITIAL ENCOUNTER: ICD-10-CM

## 2022-11-21 PROCEDURE — 71046 X-RAY EXAM CHEST 2 VIEWS: CPT

## 2022-11-21 PROCEDURE — 99213 OFFICE O/P EST LOW 20 MIN: CPT | Performed by: FAMILY MEDICINE

## 2022-11-21 RX ORDER — NAPROXEN 500 MG/1
500 TABLET ORAL 2 TIMES DAILY WITH MEALS
Qty: 20 TABLET | Refills: 0 | Status: SHIPPED | OUTPATIENT
Start: 2022-11-21 | End: 2022-12-01

## 2022-11-21 ASSESSMENT — FIBROSIS 4 INDEX: FIB4 SCORE: 1.1

## 2022-11-21 ASSESSMENT — ENCOUNTER SYMPTOMS
MYALGIAS: 0
NAUSEA: 0
VOMITING: 0
NUMBNESS: 1
SORE THROAT: 0
CHILLS: 0
DIZZINESS: 0
FEVER: 0
WEAKNESS: 0
SHORTNESS OF BREATH: 0
COUGH: 0

## 2022-11-22 NOTE — PROGRESS NOTES
Subjective:   Mei Sullivan is a 62 y.o. female who presents for Chest Injury (Fell down last night in the parking lot hit  left side of chest down arm )        Chest Injury  Chronicity: Reports left-sided anterior lateral chest wall pain, onset yesterday after trip and fall and landing directly onto left chest wall with upper extremity flexed at the elbow, reports left-sided chest wall pain radiating down left arm intermittently. The current episode started yesterday. The problem has been waxing and waning. Associated symptoms include numbness (Numbness and tingling left arm intermittently). Pertinent negatives include no chills, coughing, fever, myalgias, nausea, rash, sore throat, vomiting or weakness. Exacerbated by: Direct pressure, movement. She has tried rest for the symptoms. The treatment provided mild relief.   PMH:  has a past medical history of Arthritis, Cataract, Dyspnea, GERD (gastroesophageal reflux disease), Heart burn, High cholesterol, Indigestion, Palpitations, and Pneumonia (1989).  MEDS:   Current Outpatient Medications:     naproxen (NAPROSYN) 500 MG Tab, Take 1 Tablet by mouth 2 times a day with meals for 10 days., Disp: 20 Tablet, Rfl: 0    etodolac (LODINE) 400 MG tablet, TAKE ONE TABLET BY MOUTH TWICE A DAY WITH MEALS AS NEEDED FOR ARTHRITIS PAIN, Disp: 60 Tablet, Rfl: 0    Esomeprazole Magnesium (NEXIUM PO), Take 20 mg by mouth every day., Disp: , Rfl:     pantoprazole (PROTONIX) 40 MG Tablet Delayed Response, TAKE ONE TABLET BY MOUTH DAILY, Disp: 30 Tablet, Rfl: 0    atorvastatin (LIPITOR) 10 MG Tab, Take 1 Tablet by mouth at bedtime. (Need to follow-up with new provider.), Disp: 90 Tablet, Rfl: 0    estradiol (ESTRACE) 0.1 MG/GM vaginal cream, Insert 1 g into the vagina every 3 days., Disp: 42.5 g, Rfl: 3    Protein POWD, Take 2 Tbsp by mouth every 48 hours. Patient uses Isopure Powder in milk with banana and this helps hot flashes , Disp: , Rfl:   ALLERGIES:   Allergies   Allergen  "Reactions    Fosamax      Esophageal Pain     SURGHX:   Past Surgical History:   Procedure Laterality Date    CATARACT PHACO WITH IOL Right 7/10/2018    Procedure: CATARACT PHACO WITH IOL;  Surgeon: Martell Rai M.D.;  Location: SURGERY SAME DAY [facility];  Service: Ophthalmology    CATARACT PHACO WITH IOL Left 6/26/2018    Procedure: CATARACT PHACO WITH IOL;  Surgeon: Martell Rai M.D.;  Location: SURGERY SAME DAY [facility];  Service: Ophthalmology    NEUROMA EXCISION  10/18/2013    Performed by Jesenia Baxter D.P.M. at SURGERY SAME DAY [facility]    NEUROMA MORTONS EXCISION  1990    Left foot    LAMINOTOMY  1985    coccyxectomy    LAPAROSCOPY  1982    TONSILLECTOMY AND ADENOIDECTOMY  1972    OPEN REDUCTION      sacrum/coccyx     SOCHX:  reports that she quit smoking about 13 years ago. Her smoking use included cigarettes. She has a 50.00 pack-year smoking history. She has never used smokeless tobacco. She reports current alcohol use of about 0.6 oz per week. She reports that she does not use drugs.  FH:   Family History   Problem Relation Age of Onset    Hypertension Father      Review of Systems   Constitutional:  Negative for chills and fever.   HENT:  Negative for sore throat.    Respiratory:  Negative for cough and shortness of breath.    Gastrointestinal:  Negative for nausea and vomiting.   Musculoskeletal:  Negative for myalgias.   Skin:  Negative for rash.   Neurological:  Positive for numbness (Numbness and tingling left arm intermittently). Negative for dizziness and weakness.      Objective:   BP (!) 140/72 (BP Location: Right arm, Patient Position: Sitting, BP Cuff Size: Adult)   Pulse 60   Temp 36.8 °C (98.2 °F) (Temporal)   Resp 16   Ht 1.676 m (5' 6\")   Wt 70.8 kg (156 lb)   LMP 08/01/2006   SpO2 99%   BMI 25.18 kg/m²   Physical Exam  Vitals and nursing note reviewed.   Constitutional:       General: She is not in acute distress.     Appearance: She is well-developed. "   HENT:      Head: Normocephalic and atraumatic.      Right Ear: External ear normal.      Left Ear: External ear normal.      Nose: Nose normal.      Mouth/Throat:      Mouth: Mucous membranes are moist.   Eyes:      Conjunctiva/sclera: Conjunctivae normal.   Cardiovascular:      Rate and Rhythm: Normal rate and regular rhythm.   Pulmonary:      Effort: Pulmonary effort is normal. No respiratory distress.      Breath sounds: Normal breath sounds. No wheezing or rhonchi.   Chest:       Abdominal:      General: There is no distension.   Musculoskeletal:         General: Normal range of motion.      Left shoulder: Normal.      Left upper arm: Normal.   Skin:     General: Skin is warm and dry.   Neurological:      General: No focal deficit present.      Mental Status: She is alert and oriented to person, place, and time. Mental status is at baseline.      Gait: Gait (gait at baseline) normal.   Psychiatric:         Judgment: Judgment normal.     DX-CHEST-2 VIEWS  Order: [id number]  Status: Final result     Visible to patient: No (scheduled for 11/22/2022  3:24 PM)     Dx: Sprain of chest wall, initial encounter     0 Result Notes  Details    Reading Physician Reading Date Result Priority   Deven Ryan M.D.  [phone] 11/21/2022 Urgent Care     Narrative & Impression     11/21/2022 5:06 PM     HISTORY/REASON FOR EXAM:  Pain Following Trauma.  Chest pain, injury     TECHNIQUE/EXAM DESCRIPTION AND NUMBER OF VIEWS:  Two views of the chest.     COMPARISON:  2V chest 7/8/2015     FINDINGS:  LUNGS: The lungs are clear.     HEART and MEDIASTINUM: normal in size.     Pleura: There are no pleural effusion or pneumothoraces.     Osseous structures: No significant bony abnormality.        IMPRESSION:     Negative two views of the chest.           Exam Ended: 11/21/22  5:20 PM Last Resulted: 11/21/22  5:22 PM                     Assessment/Plan:   1. Sprain of chest wall, initial encounter  - DX-CHEST-2 VIEWS; Future  -  naproxen (NAPROSYN) 500 MG Tab; Take 1 Tablet by mouth 2 times a day with meals for 10 days.  Dispense: 20 Tablet; Refill: 0        Medical Decision Making/Course:  In the course of preparing for this visit with review of the pertinent past medical history, recent and past clinic visits, current medications, and performing chart, immunization, medical history and medication reconciliation, and in the further course of obtaining the current history pertinent to the clinic visit today, performing an exam and evaluation, ordering and independently evaluating labs, tests including independent interpretation and evaluation of x-ray imaging, and/or procedures, prescribing any recommended new medications as noted above, providing any pertinent counseling and education and recommending further coordination of care, at least  34 minutes of total time were spent during this encounter.      Discussed close monitoring, return precautions, and supportive measures of maintaining adequate fluid hydration and caloric intake, relative rest and symptom management as needed for pain and/or fever.    Differential diagnosis, natural history, supportive care, and indications for immediate follow-up discussed.     Advised the patient to follow-up with the primary care physician for recheck, reevaluation, and consideration of further management.    Please note that this dictation was created using voice recognition software. I have worked with consultants from the vendor as well as technical experts from [facility] [employer] to optimize the interface. I have made every reasonable attempt to correct obvious errors, but I expect that there are errors of grammar and possibly content that I did not discover before finalizing the note.

## 2022-12-09 DIAGNOSIS — M15.9 PRIMARY OSTEOARTHRITIS INVOLVING MULTIPLE JOINTS: ICD-10-CM

## 2022-12-12 RX ORDER — ETODOLAC 400 MG/1
TABLET, FILM COATED ORAL
Qty: 60 TABLET | Refills: 0 | Status: SHIPPED | OUTPATIENT
Start: 2022-12-12 | End: 2023-01-10

## 2023-01-07 DIAGNOSIS — M15.9 PRIMARY OSTEOARTHRITIS INVOLVING MULTIPLE JOINTS: ICD-10-CM

## 2023-01-10 RX ORDER — ETODOLAC 400 MG/1
TABLET, FILM COATED ORAL
Qty: 60 TABLET | Refills: 0 | Status: SHIPPED | OUTPATIENT
Start: 2023-01-10 | End: 2023-02-07

## 2023-01-18 ENCOUNTER — APPOINTMENT (OUTPATIENT)
Dept: MEDICAL GROUP | Facility: PHYSICIAN GROUP | Age: 63
End: 2023-01-18
Payer: COMMERCIAL

## 2023-01-24 ENCOUNTER — APPOINTMENT (OUTPATIENT)
Dept: MEDICAL GROUP | Facility: PHYSICIAN GROUP | Age: 63
End: 2023-01-24
Payer: COMMERCIAL

## 2023-01-26 ENCOUNTER — APPOINTMENT (OUTPATIENT)
Dept: MEDICAL GROUP | Facility: PHYSICIAN GROUP | Age: 63
End: 2023-01-26
Payer: COMMERCIAL

## 2023-02-05 DIAGNOSIS — M15.9 PRIMARY OSTEOARTHRITIS INVOLVING MULTIPLE JOINTS: ICD-10-CM

## 2023-02-07 RX ORDER — ETODOLAC 400 MG/1
TABLET, FILM COATED ORAL
Qty: 60 TABLET | Refills: 0 | Status: SHIPPED | OUTPATIENT
Start: 2023-02-07

## 2023-02-08 NOTE — TELEPHONE ENCOUNTER
Received request via: Patient    Was the patient seen in the last year in this department? Yes    Does the patient have an active prescription (recently filled or refills available) for medication(s) requested? No    Does the patient have custodial Plus and need 100 day supply (blood pressure, diabetes and cholesterol meds only)? Patient does not have SCP

## 2023-10-30 DIAGNOSIS — Z76.0 MEDICATION REFILL: ICD-10-CM

## 2023-10-31 RX ORDER — PANTOPRAZOLE SODIUM 40 MG/1
TABLET, DELAYED RELEASE ORAL
Qty: 30 TABLET | Refills: 0 | OUTPATIENT
Start: 2023-10-31

## 2023-10-31 RX ORDER — ESOMEPRAZOLE MAGNESIUM 20 MG/1
20 GRANULE, DELAYED RELEASE ORAL DAILY
Refills: 0 | OUTPATIENT
Start: 2023-10-31

## 2024-05-14 ENCOUNTER — DOCUMENTATION (OUTPATIENT)
Dept: HEALTH INFORMATION MANAGEMENT | Facility: OTHER | Age: 64
End: 2024-05-14
Payer: COMMERCIAL

## 2024-08-07 ENCOUNTER — DOCUMENTATION (OUTPATIENT)
Dept: HEALTH INFORMATION MANAGEMENT | Facility: OTHER | Age: 64
End: 2024-08-07
Payer: COMMERCIAL

## 2024-09-16 SDOH — HEALTH STABILITY: PHYSICAL HEALTH: ON AVERAGE, HOW MANY DAYS PER WEEK DO YOU ENGAGE IN MODERATE TO STRENUOUS EXERCISE (LIKE A BRISK WALK)?: 3 DAYS

## 2024-09-16 SDOH — HEALTH STABILITY: MENTAL HEALTH
STRESS IS WHEN SOMEONE FEELS TENSE, NERVOUS, ANXIOUS, OR CAN'T SLEEP AT NIGHT BECAUSE THEIR MIND IS TROUBLED. HOW STRESSED ARE YOU?: NOT AT ALL

## 2024-09-16 SDOH — ECONOMIC STABILITY: FOOD INSECURITY: WITHIN THE PAST 12 MONTHS, THE FOOD YOU BOUGHT JUST DIDN'T LAST AND YOU DIDN'T HAVE MONEY TO GET MORE.: NEVER TRUE

## 2024-09-16 SDOH — ECONOMIC STABILITY: INCOME INSECURITY: IN THE LAST 12 MONTHS, WAS THERE A TIME WHEN YOU WERE NOT ABLE TO PAY THE MORTGAGE OR RENT ON TIME?: NO

## 2024-09-16 SDOH — ECONOMIC STABILITY: FOOD INSECURITY: WITHIN THE PAST 12 MONTHS, YOU WORRIED THAT YOUR FOOD WOULD RUN OUT BEFORE YOU GOT MONEY TO BUY MORE.: NEVER TRUE

## 2024-09-16 SDOH — HEALTH STABILITY: PHYSICAL HEALTH: ON AVERAGE, HOW MANY MINUTES DO YOU ENGAGE IN EXERCISE AT THIS LEVEL?: 30 MIN

## 2024-09-16 SDOH — ECONOMIC STABILITY: INCOME INSECURITY: HOW HARD IS IT FOR YOU TO PAY FOR THE VERY BASICS LIKE FOOD, HOUSING, MEDICAL CARE, AND HEATING?: NOT HARD AT ALL

## 2024-09-16 ASSESSMENT — SOCIAL DETERMINANTS OF HEALTH (SDOH)
HOW OFTEN DO YOU ATTENT MEETINGS OF THE CLUB OR ORGANIZATION YOU BELONG TO?: NEVER
HOW OFTEN DO YOU ATTENT MEETINGS OF THE CLUB OR ORGANIZATION YOU BELONG TO?: NEVER
IN A TYPICAL WEEK, HOW MANY TIMES DO YOU TALK ON THE PHONE WITH FAMILY, FRIENDS, OR NEIGHBORS?: THREE TIMES A WEEK
HOW OFTEN DO YOU HAVE A DRINK CONTAINING ALCOHOL: 2-3 TIMES A WEEK
DO YOU BELONG TO ANY CLUBS OR ORGANIZATIONS SUCH AS CHURCH GROUPS UNIONS, FRATERNAL OR ATHLETIC GROUPS, OR SCHOOL GROUPS?: NO
IN A TYPICAL WEEK, HOW MANY TIMES DO YOU TALK ON THE PHONE WITH FAMILY, FRIENDS, OR NEIGHBORS?: THREE TIMES A WEEK
HOW MANY DRINKS CONTAINING ALCOHOL DO YOU HAVE ON A TYPICAL DAY WHEN YOU ARE DRINKING: 1 OR 2
HOW OFTEN DO YOU ATTEND CHURCH OR RELIGIOUS SERVICES?: NEVER
DO YOU BELONG TO ANY CLUBS OR ORGANIZATIONS SUCH AS CHURCH GROUPS UNIONS, FRATERNAL OR ATHLETIC GROUPS, OR SCHOOL GROUPS?: NO
HOW OFTEN DO YOU ATTEND CHURCH OR RELIGIOUS SERVICES?: NEVER
ARE YOU MARRIED, WIDOWED, DIVORCED, SEPARATED, NEVER MARRIED, OR LIVING WITH A PARTNER?: LIVING WITH PARTNER
HOW OFTEN DO YOU GET TOGETHER WITH FRIENDS OR RELATIVES?: ONCE A WEEK
HOW OFTEN DO YOU GET TOGETHER WITH FRIENDS OR RELATIVES?: ONCE A WEEK
ARE YOU MARRIED, WIDOWED, DIVORCED, SEPARATED, NEVER MARRIED, OR LIVING WITH A PARTNER?: LIVING WITH PARTNER
IN THE PAST 12 MONTHS, HAS THE ELECTRIC, GAS, OIL, OR WATER COMPANY THREATENED TO SHUT OFF SERVICE IN YOUR HOME?: NO
WITHIN THE PAST 12 MONTHS, YOU WORRIED THAT YOUR FOOD WOULD RUN OUT BEFORE YOU GOT THE MONEY TO BUY MORE: NEVER TRUE
HOW OFTEN DO YOU HAVE SIX OR MORE DRINKS ON ONE OCCASION: NEVER
HOW HARD IS IT FOR YOU TO PAY FOR THE VERY BASICS LIKE FOOD, HOUSING, MEDICAL CARE, AND HEATING?: NOT HARD AT ALL

## 2024-09-16 ASSESSMENT — LIFESTYLE VARIABLES
SKIP TO QUESTIONS 9-10: 1
AUDIT-C TOTAL SCORE: 3
HOW MANY STANDARD DRINKS CONTAINING ALCOHOL DO YOU HAVE ON A TYPICAL DAY: 1 OR 2
HOW OFTEN DO YOU HAVE SIX OR MORE DRINKS ON ONE OCCASION: NEVER
HOW OFTEN DO YOU HAVE A DRINK CONTAINING ALCOHOL: 2-3 TIMES A WEEK

## 2024-09-17 ENCOUNTER — OFFICE VISIT (OUTPATIENT)
Dept: MEDICAL GROUP | Facility: PHYSICIAN GROUP | Age: 64
End: 2024-09-17
Payer: COMMERCIAL

## 2024-09-17 VITALS
WEIGHT: 161.38 LBS | OXYGEN SATURATION: 96 % | HEART RATE: 70 BPM | BODY MASS INDEX: 26.05 KG/M2 | DIASTOLIC BLOOD PRESSURE: 70 MMHG | SYSTOLIC BLOOD PRESSURE: 110 MMHG | TEMPERATURE: 97.7 F

## 2024-09-17 DIAGNOSIS — Z23 NEED FOR VACCINATION: ICD-10-CM

## 2024-09-17 DIAGNOSIS — Z86.19 HISTORY OF HEPATITIS B: ICD-10-CM

## 2024-09-17 DIAGNOSIS — Z87.39 HISTORY OF OSTEOPENIA: ICD-10-CM

## 2024-09-17 DIAGNOSIS — Z76.89 ENCOUNTER TO ESTABLISH CARE: ICD-10-CM

## 2024-09-17 DIAGNOSIS — E78.00 HYPERCHOLESTEREMIA: ICD-10-CM

## 2024-09-17 DIAGNOSIS — Z78.0 POSTMENOPAUSAL ESTROGEN DEFICIENCY: ICD-10-CM

## 2024-09-17 DIAGNOSIS — Z12.31 ENCOUNTER FOR SCREENING MAMMOGRAM FOR MALIGNANT NEOPLASM OF BREAST: ICD-10-CM

## 2024-09-17 DIAGNOSIS — Z00.00 ROUTINE HEALTH MAINTENANCE: ICD-10-CM

## 2024-09-17 DIAGNOSIS — H91.93 BILATERAL HEARING LOSS, UNSPECIFIED HEARING LOSS TYPE: ICD-10-CM

## 2024-09-17 DIAGNOSIS — K21.00 GASTROESOPHAGEAL REFLUX DISEASE WITH ESOPHAGITIS WITHOUT HEMORRHAGE: ICD-10-CM

## 2024-09-17 DIAGNOSIS — E55.9 VITAMIN D DEFICIENCY: ICD-10-CM

## 2024-09-17 PROBLEM — K21.9 GERD (GASTROESOPHAGEAL REFLUX DISEASE): Status: ACTIVE | Noted: 2024-09-17

## 2024-09-17 PROBLEM — Z01.419 WELL WOMAN EXAM WITH ROUTINE GYNECOLOGICAL EXAM: Status: RESOLVED | Noted: 2021-07-15 | Resolved: 2024-09-17

## 2024-09-17 PROCEDURE — 90715 TDAP VACCINE 7 YRS/> IM: CPT

## 2024-09-17 PROCEDURE — 90471 IMMUNIZATION ADMIN: CPT

## 2024-09-17 PROCEDURE — 99214 OFFICE O/P EST MOD 30 MIN: CPT | Mod: 25 | Performed by: STUDENT IN AN ORGANIZED HEALTH CARE EDUCATION/TRAINING PROGRAM

## 2024-09-17 RX ORDER — OMEPRAZOLE 40 MG/1
40 CAPSULE, DELAYED RELEASE ORAL DAILY
Qty: 90 CAPSULE | Refills: 3 | Status: SHIPPED | OUTPATIENT
Start: 2024-09-17

## 2024-09-17 ASSESSMENT — FIBROSIS 4 INDEX: FIB4 SCORE: 1.13

## 2024-09-17 ASSESSMENT — PATIENT HEALTH QUESTIONNAIRE - PHQ9: CLINICAL INTERPRETATION OF PHQ2 SCORE: 0

## 2024-09-17 NOTE — PROGRESS NOTES
Subjective:     CC:  establish care    History of Present Illness  The patient presents to establish care. She is accompanied by her . Prior PCP Leah Sweeney.    She was diagnosed with osteopenia and is currently taking Viactiv daily. She needs to schedule a bone density test.  June 2014 DEXA showed osteopenia in the hip and spine.  May 2017 DEXA was normal.    She has a history of Cox's esophagus and undergoes endoscopies every three years, with the last one performed in 05/2021 which showed resolution of the Cox's esophagus. She occasionally experiences difficulty swallowing when eating and drinking simultaneously.    She is due for a mammogram, having last undergone the procedure two years ago. She also needs to schedule a Pap smear.  October 2022 CT chest showed 2 stable 3 mm pulmonary nodules, lung RADS 2.    She quit smoking in 2009 and consumes alcohol moderately, typically a glass of wine. She has been tested for hepatitis C and was found negative. She reports she was unable to donate blood due to history of hepatitis B.     She has noticed a slight decrease in her hearing and wonders if her ears need cleaning.        Health Maintenance: Completed    Allergies   Allergen Reactions    Fosamax      Esophageal Pain     Patient Active Problem List   Diagnosis    Constipation    Osteoarthritis    Primary insomnia    Bulimia    S/P balloon dilatation of esophageal stricture    Post menopausal syndrome    Coccyx pain    Former smoker    High cholesterol    History of osteopenia    GERD (gastroesophageal reflux disease)     Current Outpatient Medications   Medication Sig Dispense Refill    omeprazole (PRILOSEC) 40 MG delayed-release capsule Take 1 Capsule by mouth every day. 90 Capsule 3    Calcium-Vitamin D-Vitamin K (VIACTIV PO) Take  by mouth.       No current facility-administered medications for this visit.     Past Surgical History:   Procedure Laterality Date    CATARACT PHACO WITH IOL Right  07/10/2018    Procedure: CATARACT PHACO WITH IOL;  Surgeon: Martell Rai M.D.;  Location: SURGERY SAME DAY [facility];  Service: Ophthalmology    CATARACT PHACO WITH IOL Left 06/26/2018    Procedure: CATARACT PHACO WITH IOL;  Surgeon: Martell Rai M.D.;  Location: SURGERY SAME DAY [facility];  Service: Ophthalmology    NEUROMA EXCISION  10/18/2013    Performed by Jesenia Baxter D.P.M. at SURGERY SAME DAY [facility]    NEUROMA MORTONS EXCISION  1990    Left foot    LAMINOTOMY  1985    coccyxectomy    LAPAROSCOPY  1982    TONSILLECTOMY AND ADENOIDECTOMY  1972    EYE SURGERY      OPEN REDUCTION      sacrum/coccyx      Social History     Socioeconomic History    Marital status:      Spouse name: Not on file    Number of children: 1    Years of education: Not on file    Highest education level: Bachelor's degree (e.g., BA, AB, BS)   Occupational History    Occupation: Supply Chain Business Analyist     Employer: [employer]   Tobacco Use    Smoking status: Former     Current packs/day: 0.00     Average packs/day: 2.0 packs/day for 25.0 years (50.0 ttl pk-yrs)     Types: Cigarettes     Start date: 1/1/1984     Quit date: 1/1/2009     Years since quitting: 15.7    Smokeless tobacco: Never    Tobacco comments:     16 years clean   Vaping Use    Vaping status: Never Used   Substance and Sexual Activity    Alcohol use: Yes     Alcohol/week: 0.6 oz     Types: 1 Glasses of wine per week    Drug use: No    Sexual activity: Yes     Partners: Male     Birth control/protection: Post-Menopausal   Other Topics Concern    Not on file   Social History Narrative    Lives with .     Social Determinants of Health     Financial Resource Strain: Low Risk  (9/16/2024)    Overall Financial Resource Strain (CARDIA)     Difficulty of Paying Living Expenses: Not hard at all   Food Insecurity: No Food Insecurity (9/16/2024)    Hunger Vital Sign     Worried About Running Out of Food in the Last Year: Never true      Ran Out of Food in the Last Year: Never true   Transportation Needs: No Transportation Needs (9/16/2024)    PRAPARE - Transportation     Lack of Transportation (Medical): No     Lack of Transportation (Non-Medical): No   Physical Activity: Insufficiently Active (9/16/2024)    Exercise Vital Sign     Days of Exercise per Week: 3 days     Minutes of Exercise per Session: 30 min   Stress: No Stress Concern Present (9/16/2024)    [place] [employer] - Occupational Stress Questionnaire     Feeling of Stress : Not at all   Social Connections: Moderately Isolated (9/16/2024)    Social Connection and Isolation Panel [NHANES]     Frequency of Communication with Friends and Family: Three times a week     Frequency of Social Gatherings with Friends and Family: Once a week     Attends Restorationism Services: Never     Active Member of Clubs or Organizations: No     Attends Club or Organization Meetings: Never     Marital Status: Living with partner   Intimate Partner Violence: Not on file   Housing Stability: Unknown (9/16/2024)    Housing Stability Vital Sign     Unable to Pay for Housing in the Last Year: No     Number of Times Moved in the Last Year: Not on file     Homeless in the Last Year: No     Family History   Problem Relation Age of Onset    Hypertension Father     Colorectal Cancer Father     Ovarian Cancer Maternal Aunt          ROS:     Constitutional:  Negative for chills, fever, fatigue, weight loss.  HEENT:  Negative for blurred vision, hearing loss, sore throat.    Respiratory:  Negative for cough, sputum production and shortness of breath.  Cardiovascular:  Negative for chest pain, palpitations and leg swelling.  Gastrointestinal:  Negative for abdominal pain, blood in stool, constipation, diarrhea and vomiting.   Skin:  Negative for rash.   Neurological:  Negative for dizziness, seizures, weakness and headaches.   Endo/Heme/Allergies:  Does not bruise/bleed easily.    Psychiatric/Behavioral:  Negative for depression, anxiety and suicidal thoughts.      Objective:     Exam: /70   Pulse 70   Temp 36.5 °C (97.7 °F) (Temporal)   Wt 73.2 kg (161 lb 6 oz)   SpO2 96%  Body mass index is 26.05 kg/m².    Gen: Alert and oriented, no acute distress.  Eyes:  PERRL, conjunctivae clear, lids normal.   ENMT: Lips without lesions, good dentition, moist mucous membranes.  Neck: Neck is supple, trachea middle, no thyromegaly.  Lungs: Normal effort, CTAB, no wheezing / rhonchi / rales.  CV: RRR, normal S1 and S2, no murmurs.  GI:  Abdomen soft, non-tender, non-distended with normal bowel sounds.  MSK:  Normal ROM.  Ext: No clubbing, cyanosis, or edema.  Skin:  Warm and dry with no rashes or lesions.  Neuro: AAO x 3, no acute focal deficits.  Psych: Normal affect and mood.      Assessment & Plan:   64 y.o. female with the following -    1. Encounter to establish care  2. Routine health maintenance  Patient presents today to establish care.  Chart was reviewed and history was discussed in detail with the patient.  Previous labs were reviewed and annual labs ordered.  UTD with cervical and colon cancer screening.  Mammogram and DEXA ordered.  Advised to get influenza vaccine in October.  - CBC WITHOUT DIFFERENTIAL; Future  - Comp Metabolic Panel; Future  - Lipid Profile; Future    3. Gastroesophageal reflux disease with esophagitis without hemorrhage  Chronic, controlled.  History of Cox's esophagus that was resolved on 2021 EGD.  Patient also has history of esophageal stricture s/p ballooning.  Follows up with GI.  Continue omeprazole 40 mg daily.  - omeprazole (PRILOSEC) 40 MG delayed-release capsule; Take 1 Capsule by mouth every day.  Dispense: 90 Capsule; Refill: 3    4. Hypercholesteremia  Chronic, uncontrolled.  September 2022  and .  Patient stopped taking atorvastatin.  ASCVD risk 3.7% and statin not indicated.  Repeat lipid panel ordered  - Lipid Profile;  Future    5. Vitamin D deficiency  Chronic, uncontrolled.  September 2022 vitamin D 28.  Continue Viactiv daily.  - VITAMIN D,25 HYDROXY (DEFICIENCY); Future    6. Bilateral hearing loss, unspecified hearing loss type  Chronic, uncontrolled.  Given referral to ENT for further evaluation.  - Referral to ENT    7. History of hepatitis B  Patient reports she was unable to donate blood due to history of hepatitis B.  Chart was reviewed and no labs were done in the past except HBV DNA in 2015 which was not detected.  Labs ordered for confirmation.  - HEP B Surface Antibody; Future  - Hep B Core AB Total; Future  - Hep B Surface Antigen; Future    8. History of osteopenia  9. Postmenopausal estrogen deficiency  Chronic, controlled.  History of osteopenia but DEXA done in 2017 was normal.  - DS-BONE DENSITY STUDY (DEXA); Future    10. Encounter for screening mammogram for malignant neoplasm of breast  - MA-SCREENING MAMMO BILAT W/TOMOSYNTHESIS W/CAD; Future    11. Need for vaccination  - TDAP VACCINE =>8YO IM          I spent a total of 35 minutes with record review, exam, communication with the patient, communication with other providers, and documentation of this encounter.    Return in about 1 month (around 10/17/2024) for Discuss labs, Discuss imaging.    Verbal consent was acquired by the patient to use [employer] ambient listening note generation during this visit: Yes.    Please note that this dictation was created using voice recognition software. I have made every reasonable attempt to correct obvious errors, but I expect that there are errors of grammar and possibly content that I did not discover before finalizing the note.

## 2024-10-10 ENCOUNTER — HOSPITAL ENCOUNTER (OUTPATIENT)
Dept: LAB | Facility: MEDICAL CENTER | Age: 64
End: 2024-10-10
Attending: STUDENT IN AN ORGANIZED HEALTH CARE EDUCATION/TRAINING PROGRAM
Payer: COMMERCIAL

## 2024-10-10 DIAGNOSIS — E55.9 VITAMIN D DEFICIENCY: ICD-10-CM

## 2024-10-10 DIAGNOSIS — E78.00 HYPERCHOLESTEREMIA: ICD-10-CM

## 2024-10-10 DIAGNOSIS — Z86.19 HISTORY OF HEPATITIS B: ICD-10-CM

## 2024-10-10 DIAGNOSIS — Z00.00 ROUTINE HEALTH MAINTENANCE: ICD-10-CM

## 2024-10-10 LAB
25(OH)D3 SERPL-MCNC: 48 NG/ML (ref 30–100)
ALBUMIN SERPL BCP-MCNC: 4.1 G/DL (ref 3.2–4.9)
ALBUMIN/GLOB SERPL: 2.2 G/DL
ALP SERPL-CCNC: 97 U/L (ref 30–99)
ALT SERPL-CCNC: 28 U/L (ref 2–50)
ANION GAP SERPL CALC-SCNC: 12 MMOL/L (ref 7–16)
AST SERPL-CCNC: 22 U/L (ref 12–45)
BILIRUB SERPL-MCNC: 0.6 MG/DL (ref 0.1–1.5)
BUN SERPL-MCNC: 11 MG/DL (ref 8–22)
CALCIUM ALBUM COR SERPL-MCNC: 9.3 MG/DL (ref 8.5–10.5)
CALCIUM SERPL-MCNC: 9.4 MG/DL (ref 8.5–10.5)
CHLORIDE SERPL-SCNC: 104 MMOL/L (ref 96–112)
CHOLEST SERPL-MCNC: 218 MG/DL (ref 100–199)
CO2 SERPL-SCNC: 26 MMOL/L (ref 20–33)
CREAT SERPL-MCNC: 0.94 MG/DL (ref 0.5–1.4)
ERYTHROCYTE [DISTWIDTH] IN BLOOD BY AUTOMATED COUNT: 42.5 FL (ref 35.9–50)
FASTING STATUS PATIENT QL REPORTED: NORMAL
GFR SERPLBLD CREATININE-BSD FMLA CKD-EPI: 68 ML/MIN/1.73 M 2
GLOBULIN SER CALC-MCNC: 1.9 G/DL (ref 1.9–3.5)
GLUCOSE SERPL-MCNC: 83 MG/DL (ref 65–99)
HBV CORE AB SERPL QL IA: REACTIVE
HBV SURFACE AB SERPL IA-ACNC: 51.5 MIU/ML (ref 0–10)
HBV SURFACE AG SER QL: NORMAL
HCT VFR BLD AUTO: 42.6 % (ref 37–47)
HDLC SERPL-MCNC: 90 MG/DL
HGB BLD-MCNC: 14.7 G/DL (ref 12–16)
LDLC SERPL CALC-MCNC: 114 MG/DL
MCH RBC QN AUTO: 32.7 PG (ref 27–33)
MCHC RBC AUTO-ENTMCNC: 34.5 G/DL (ref 32.2–35.5)
MCV RBC AUTO: 94.9 FL (ref 81.4–97.8)
PLATELET # BLD AUTO: 287 K/UL (ref 164–446)
PMV BLD AUTO: 10.9 FL (ref 9–12.9)
POTASSIUM SERPL-SCNC: 4.5 MMOL/L (ref 3.6–5.5)
PROT SERPL-MCNC: 6 G/DL (ref 6–8.2)
RBC # BLD AUTO: 4.49 M/UL (ref 4.2–5.4)
SODIUM SERPL-SCNC: 142 MMOL/L (ref 135–145)
TRIGL SERPL-MCNC: 72 MG/DL (ref 0–149)
WBC # BLD AUTO: 6.6 K/UL (ref 4.8–10.8)

## 2024-10-10 PROCEDURE — 87340 HEPATITIS B SURFACE AG IA: CPT

## 2024-10-10 PROCEDURE — 80053 COMPREHEN METABOLIC PANEL: CPT

## 2024-10-10 PROCEDURE — 86706 HEP B SURFACE ANTIBODY: CPT

## 2024-10-10 PROCEDURE — 36415 COLL VENOUS BLD VENIPUNCTURE: CPT

## 2024-10-10 PROCEDURE — 82306 VITAMIN D 25 HYDROXY: CPT

## 2024-10-10 PROCEDURE — 86704 HEP B CORE ANTIBODY TOTAL: CPT

## 2024-10-10 PROCEDURE — 80061 LIPID PANEL: CPT

## 2024-10-10 PROCEDURE — 85027 COMPLETE CBC AUTOMATED: CPT

## 2024-10-14 ENCOUNTER — HOSPITAL ENCOUNTER (OUTPATIENT)
Dept: RADIOLOGY | Facility: MEDICAL CENTER | Age: 64
End: 2024-10-14
Attending: STUDENT IN AN ORGANIZED HEALTH CARE EDUCATION/TRAINING PROGRAM
Payer: COMMERCIAL

## 2024-10-14 DIAGNOSIS — Z78.0 POSTMENOPAUSAL ESTROGEN DEFICIENCY: ICD-10-CM

## 2024-10-14 DIAGNOSIS — Z87.39 HISTORY OF OSTEOPENIA: ICD-10-CM

## 2024-10-14 PROCEDURE — 77080 DXA BONE DENSITY AXIAL: CPT

## 2024-10-15 DIAGNOSIS — Z00.6 RESEARCH STUDY PATIENT: ICD-10-CM

## 2024-10-21 ENCOUNTER — APPOINTMENT (OUTPATIENT)
Dept: MEDICAL GROUP | Facility: PHYSICIAN GROUP | Age: 64
End: 2024-10-21
Payer: COMMERCIAL

## 2024-10-24 ENCOUNTER — HOSPITAL ENCOUNTER (OUTPATIENT)
Dept: LAB | Facility: MEDICAL CENTER | Age: 64
End: 2024-10-24
Attending: STUDENT IN AN ORGANIZED HEALTH CARE EDUCATION/TRAINING PROGRAM
Payer: COMMERCIAL

## 2024-10-24 DIAGNOSIS — Z00.6 RESEARCH STUDY PATIENT: ICD-10-CM

## 2024-10-29 ENCOUNTER — APPOINTMENT (OUTPATIENT)
Dept: MEDICAL GROUP | Facility: PHYSICIAN GROUP | Age: 64
End: 2024-10-29
Payer: COMMERCIAL

## 2024-10-30 LAB
ELF SCORE: 9.36 -
HA (HYALURONIC ACID): 64.94 NG/ML
PIIINP (AMINO-TERMINAL PROPEPTIDE): 6.17 NG/ML
RELATIVE RISK: NORMAL
RISK GROUP: NORMAL
RISK: 3.3 %
TIMP-1 (TISSUE INHIBITOR OF MMP1): 245.8 NG/ML

## 2024-11-15 ENCOUNTER — HOSPITAL ENCOUNTER (OUTPATIENT)
Dept: RADIOLOGY | Facility: MEDICAL CENTER | Age: 64
End: 2024-11-15
Attending: STUDENT IN AN ORGANIZED HEALTH CARE EDUCATION/TRAINING PROGRAM
Payer: COMMERCIAL

## 2024-11-15 DIAGNOSIS — Z12.31 ENCOUNTER FOR SCREENING MAMMOGRAM FOR MALIGNANT NEOPLASM OF BREAST: ICD-10-CM

## 2024-11-15 PROCEDURE — 77067 SCR MAMMO BI INCL CAD: CPT

## 2024-11-27 ENCOUNTER — OFFICE VISIT (OUTPATIENT)
Dept: MEDICAL GROUP | Facility: PHYSICIAN GROUP | Age: 64
End: 2024-11-27
Payer: COMMERCIAL

## 2024-11-27 VITALS
HEART RATE: 78 BPM | TEMPERATURE: 99.1 F | DIASTOLIC BLOOD PRESSURE: 70 MMHG | WEIGHT: 162.3 LBS | HEIGHT: 66 IN | BODY MASS INDEX: 26.08 KG/M2 | SYSTOLIC BLOOD PRESSURE: 122 MMHG | OXYGEN SATURATION: 98 %

## 2024-11-27 DIAGNOSIS — Z23 NEED FOR VACCINATION: ICD-10-CM

## 2024-11-27 DIAGNOSIS — Z86.19 HISTORY OF HEPATITIS B: ICD-10-CM

## 2024-11-27 DIAGNOSIS — E78.00 HIGH CHOLESTEROL: Chronic | ICD-10-CM

## 2024-11-27 DIAGNOSIS — M85.88 OSTEOPENIA OF LUMBAR SPINE: Chronic | ICD-10-CM

## 2024-11-27 ASSESSMENT — FIBROSIS 4 INDEX: FIB4 SCORE: 0.93

## 2024-11-27 NOTE — PROGRESS NOTES
"Subjective:     Chief Complaint   Patient presents with    Lab Results     Lab work follow up         History of Present Illness  The patient presents to discuss labs.    She has a history of hepatitis B. She was previously on medication for elevated cholesterol. She is currently taking Viactiv and has been engaging in regular exercise for osteopenia. She is also taking omeprazole 40 mg daily.        Health Maintenance: Completed    ROS:  Negative except as stated above.      Objective:     Exam:  /70 (BP Location: Right arm, Patient Position: Sitting, BP Cuff Size: Adult)   Pulse 78   Temp 37.3 °C (99.1 °F) (Temporal)   Ht 1.686 m (5' 6.38\")   Wt 73.6 kg (162 lb 4.8 oz)   LMP 08/01/2006   SpO2 98%   BMI 25.90 kg/m²  Body mass index is 25.9 kg/m².    Physical Exam    Gen: Alert and oriented, no acute distress.  Lungs: Normal effort, CTAB, no wheezing / rhonchi / rales.  CV: RRR, normal S1 and S2, no murmurs.      Labs:   No visits with results within 1 Month(s) from this visit.   Latest known visit with results is:   Hospital Outpatient Visit on 10/24/2024   Component Date Value Ref Range Status    Elf Score 10/24/2024 9.36  See Comments - Final    Comment: Risk cut-offs to assess the likelihood of progression to cirrhosis and  liver-related clinical events within 3.9 years following baseline ELF  score (IQR: 14.0-22.4 months)*:  Lower risk < 9.80  Mid risk 9.80 -  11.29  Higher risk >11.29  Note: The ELF(TM) Score is a unitless numerical value. *Adeel SA,  Brandon VW, Tricia T, et al. Selonsertib for patients with bridging  fibrosis or compensated cirrhosis due to DIAZ: Results from randomized  phase III STELLAR trials. J Hepatol. 2020 Jul;73(1):26-39.      Risk Group 10/24/2024 Low   Final    Risk 10/24/2024 3.3  % Final    (1.3%,5.3%) Chance of liver related event within 5 years    Relative Risk 10/24/2024 1x   Final    Comment: *Relative Risks (calculated hazard ratios using Easley proportional " hazard  model after adjusting for age and sex, relative to ELF < 9.8) for Liver  Related Outcomes within 5 years.    Prognostic Determinations:  A review of clinical outcomes from 457 patients followed for a mean of 5  years was used to establish the prognostic utility of ELF (Allie et al. 2019).  The results from this analysis demonstrate that the ELF scores are useful  for categorizing patients into risk groups according to the likelihood of  future liver-related clinical outcomes. These outcomes are defined as liver  related death, complication of portal hypertension, liver cancer and liver  transplantation.    Test Description:  The Enhanced Liver Fibrosis (ELF) Score is a multivariate index immunoassay  intended to provide an algorithm score based on quantitative measurements of  hyaluronic acid (HA), amino-terminal propeptide of type III procollagen  (PIIINP) and tissue inhibitor of metalloproteinase 1 (TIMP-1) from [facility].  This test combines the three serum biomarkers to assess the likelihood of  progression to cirrhosis and liver-related clinical events in patients with  advanced fibrosis due to DIAZ. The analytical measurement range is 4.5 to  14.7    The laboratory developed assays used to generate clinical results have been  validated in accordance with standard industry guidelines.  They have not    testing Laboratory has been accredited for high complexity testing in  accordance with CLIA '88 requirements.    Test Performed at: [facility]  [address] [place] [address], [phone]  : Andreia Cole MD      BOND (Hyaluronic acid) 10/24/2024 64.94  Not Estab. ng/mL Final    PIIINP (Amino-terminal propeptide) 10/24/2024 6.17  Not Estab. ng/mL Final    TIMP-1 (Tissue inhibitor of MMP1) 10/24/2024 245.8  Not Estab. ng/mL Final         Assessment & Plan:     64 y.o. female with the following -     1. Osteopenia of lumbar spine  Chronic,  uncontrolled.  Recent DEXA continues to show osteopenia in the lumbar spine but fracture risk low and treatment not indicated.  Calcium and vitamin D WNL.  Continue daily Viactiv and strength training with resistance also recommended.    2. High cholesterol  Chronic, uncontrolled.   and .  ASCVD risk 3.8%.  Risk was discussed with the patient and statin not indicated.  Advised healthy diet with limited saturated/trans fat and regular exercise.    3. Need for vaccination  - INFLUENZA VACCINE TRI INJ (PF)    4. History of hepatitis B  Chronic, controlled.  Positive surface and core antibodies with no current infection.  Nothing further needs to be done.          Return in about 1 year (around 11/27/2025) for Annual preventive visit.    Verbal consent was acquired by the patient to use [employer] ambient listening note generation during this visit: Yes.    Please note that this dictation was created using voice recognition software. I have made every reasonable attempt to correct obvious errors, but I expect that there are errors of grammar and possibly content that I did not discover before finalizing the note.

## 2025-03-07 ENCOUNTER — OFFICE VISIT (OUTPATIENT)
Dept: URGENT CARE | Facility: PHYSICIAN GROUP | Age: 65
End: 2025-03-07
Payer: COMMERCIAL

## 2025-03-07 VITALS
BODY MASS INDEX: 26.1 KG/M2 | SYSTOLIC BLOOD PRESSURE: 124 MMHG | HEIGHT: 66 IN | HEART RATE: 92 BPM | DIASTOLIC BLOOD PRESSURE: 80 MMHG | OXYGEN SATURATION: 100 % | WEIGHT: 162.4 LBS | RESPIRATION RATE: 18 BRPM | TEMPERATURE: 97.9 F

## 2025-03-07 DIAGNOSIS — J02.9 PHARYNGITIS, UNSPECIFIED ETIOLOGY: ICD-10-CM

## 2025-03-07 DIAGNOSIS — R05.1 ACUTE COUGH: ICD-10-CM

## 2025-03-07 LAB
FLUAV RNA SPEC QL NAA+PROBE: NEGATIVE
FLUBV RNA SPEC QL NAA+PROBE: NEGATIVE
RSV RNA SPEC QL NAA+PROBE: POSITIVE
S PYO DNA SPEC NAA+PROBE: NOT DETECTED
SARS-COV-2 RNA RESP QL NAA+PROBE: NEGATIVE

## 2025-03-07 PROCEDURE — 3079F DIAST BP 80-89 MM HG: CPT | Performed by: NURSE PRACTITIONER

## 2025-03-07 PROCEDURE — 99213 OFFICE O/P EST LOW 20 MIN: CPT | Performed by: NURSE PRACTITIONER

## 2025-03-07 PROCEDURE — 87651 STREP A DNA AMP PROBE: CPT | Performed by: NURSE PRACTITIONER

## 2025-03-07 PROCEDURE — 3074F SYST BP LT 130 MM HG: CPT | Performed by: NURSE PRACTITIONER

## 2025-03-07 PROCEDURE — 0241U POCT CEPHEID COV-2, FLU A/B, RSV - PCR: CPT | Performed by: NURSE PRACTITIONER

## 2025-03-07 RX ORDER — DEXTROMETHORPHAN HYDROBROMIDE AND PROMETHAZINE HYDROCHLORIDE 15; 6.25 MG/5ML; MG/5ML
5 SYRUP ORAL EVERY 4 HOURS PRN
Qty: 120 ML | Refills: 0 | Status: SHIPPED | OUTPATIENT
Start: 2025-03-07

## 2025-03-07 ASSESSMENT — VISUAL ACUITY: OU: 1

## 2025-03-07 ASSESSMENT — FIBROSIS 4 INDEX: FIB4 SCORE: 0.93
